# Patient Record
Sex: FEMALE | Race: WHITE | Employment: FULL TIME | URBAN - METROPOLITAN AREA
[De-identification: names, ages, dates, MRNs, and addresses within clinical notes are randomized per-mention and may not be internally consistent; named-entity substitution may affect disease eponyms.]

---

## 2017-01-04 RX ORDER — QUETIAPINE FUMARATE 25 MG/1
TABLET, FILM COATED ORAL
Qty: 30 TABLET | Refills: 5 | Status: SHIPPED | OUTPATIENT
Start: 2017-01-04 | End: 2017-04-13 | Stop reason: CLARIF

## 2017-02-01 RX ORDER — DULOXETIN HYDROCHLORIDE 30 MG/1
CAPSULE, DELAYED RELEASE ORAL
Qty: 30 CAPSULE | Refills: 5 | Status: SHIPPED | OUTPATIENT
Start: 2017-02-01 | End: 2017-04-13 | Stop reason: SDUPTHER

## 2017-02-03 RX ORDER — ALPRAZOLAM 0.5 MG/1
TABLET ORAL
Qty: 30 TABLET | Refills: 0 | Status: SHIPPED | OUTPATIENT
Start: 2017-02-03 | End: 2017-03-09 | Stop reason: SDUPTHER

## 2017-02-15 RX ORDER — SPIRONOLACTONE 50 MG/1
TABLET, FILM COATED ORAL
Qty: 30 TABLET | Refills: 3 | Status: SHIPPED | OUTPATIENT
Start: 2017-02-15 | End: 2017-04-13 | Stop reason: SDUPTHER

## 2017-03-06 RX ORDER — CITALOPRAM 20 MG/1
TABLET ORAL
Qty: 30 TABLET | Refills: 3 | Status: SHIPPED | OUTPATIENT
Start: 2017-03-06 | End: 2017-07-07 | Stop reason: SDUPTHER

## 2017-03-13 ENCOUNTER — TELEPHONE (OUTPATIENT)
Dept: INTERNAL MEDICINE CLINIC | Age: 37
End: 2017-03-13

## 2017-03-13 RX ORDER — ALPRAZOLAM 0.5 MG/1
TABLET ORAL
Qty: 30 TABLET | Refills: 0 | Status: SHIPPED | OUTPATIENT
Start: 2017-03-13 | End: 2017-04-13 | Stop reason: SDUPTHER

## 2017-03-14 RX ORDER — PROPRANOLOL HYDROCHLORIDE 10 MG/1
10 TABLET ORAL 2 TIMES DAILY
Qty: 60 TABLET | Refills: 1 | Status: SHIPPED | OUTPATIENT
Start: 2017-03-14 | End: 2017-04-13 | Stop reason: CLARIF

## 2017-04-10 ENCOUNTER — TELEPHONE (OUTPATIENT)
Dept: INTERNAL MEDICINE CLINIC | Age: 37
End: 2017-04-10

## 2017-04-10 DIAGNOSIS — R19.7 DIARRHEA, UNSPECIFIED TYPE: ICD-10-CM

## 2017-04-10 DIAGNOSIS — R10.9 ABDOMINAL PAIN, UNSPECIFIED LOCATION: Primary | ICD-10-CM

## 2017-04-13 ENCOUNTER — OFFICE VISIT (OUTPATIENT)
Dept: INTERNAL MEDICINE CLINIC | Age: 37
End: 2017-04-13
Payer: COMMERCIAL

## 2017-04-13 VITALS
RESPIRATION RATE: 16 BRPM | TEMPERATURE: 98 F | SYSTOLIC BLOOD PRESSURE: 132 MMHG | DIASTOLIC BLOOD PRESSURE: 88 MMHG | HEART RATE: 88 BPM | WEIGHT: 208 LBS | BODY MASS INDEX: 36.85 KG/M2

## 2017-04-13 DIAGNOSIS — F41.1 GENERALIZED ANXIETY DISORDER: ICD-10-CM

## 2017-04-13 DIAGNOSIS — F32.A DEPRESSION, UNSPECIFIED DEPRESSION TYPE: Primary | ICD-10-CM

## 2017-04-13 DIAGNOSIS — G43.809 OTHER MIGRAINE WITHOUT STATUS MIGRAINOSUS, NOT INTRACTABLE: ICD-10-CM

## 2017-04-13 PROCEDURE — 99214 OFFICE O/P EST MOD 30 MIN: CPT | Performed by: INTERNAL MEDICINE

## 2017-04-13 RX ORDER — DULOXETIN HYDROCHLORIDE 30 MG/1
CAPSULE, DELAYED RELEASE ORAL
Qty: 30 CAPSULE | Refills: 5 | Status: SHIPPED | OUTPATIENT
Start: 2017-04-13 | End: 2017-12-27 | Stop reason: ALTCHOICE

## 2017-04-13 RX ORDER — SPIRONOLACTONE 50 MG/1
TABLET, FILM COATED ORAL
Qty: 60 TABLET | Refills: 5 | Status: SHIPPED | OUTPATIENT
Start: 2017-04-13 | End: 2017-11-18 | Stop reason: SDUPTHER

## 2017-04-13 RX ORDER — ALPRAZOLAM 0.5 MG/1
TABLET ORAL
Qty: 30 TABLET | Refills: 0 | Status: SHIPPED | OUTPATIENT
Start: 2017-04-13 | End: 2017-05-16 | Stop reason: SDUPTHER

## 2017-04-13 RX ORDER — CETIRIZINE HYDROCHLORIDE, PSEUDOEPHEDRINE HYDROCHLORIDE 5; 120 MG/1; MG/1
TABLET, FILM COATED, EXTENDED RELEASE ORAL
Qty: 60 TABLET | Refills: 5 | Status: SHIPPED | OUTPATIENT
Start: 2017-04-13 | End: 2017-10-31 | Stop reason: SDUPTHER

## 2017-04-13 RX ORDER — DULOXETIN HYDROCHLORIDE 60 MG/1
CAPSULE, DELAYED RELEASE ORAL
Qty: 30 CAPSULE | Refills: 5 | Status: SHIPPED | OUTPATIENT
Start: 2017-04-13 | End: 2017-11-18 | Stop reason: SDUPTHER

## 2017-05-16 ENCOUNTER — TELEPHONE (OUTPATIENT)
Dept: INTERNAL MEDICINE CLINIC | Age: 37
End: 2017-05-16

## 2017-05-16 RX ORDER — ALPRAZOLAM 0.5 MG/1
TABLET ORAL
Qty: 30 TABLET | Refills: 0 | Status: SHIPPED | OUTPATIENT
Start: 2017-05-16 | End: 2017-06-21 | Stop reason: SDUPTHER

## 2017-06-21 RX ORDER — ALPRAZOLAM 0.5 MG/1
TABLET ORAL
Qty: 30 TABLET | Refills: 0 | Status: SHIPPED | OUTPATIENT
Start: 2017-06-21 | End: 2017-07-25 | Stop reason: SDUPTHER

## 2017-07-07 RX ORDER — CITALOPRAM 20 MG/1
TABLET ORAL
Qty: 30 TABLET | Refills: 2 | Status: SHIPPED | OUTPATIENT
Start: 2017-07-07 | End: 2017-08-29 | Stop reason: ALTCHOICE

## 2017-07-25 RX ORDER — ALPRAZOLAM 0.5 MG/1
TABLET ORAL
Qty: 30 TABLET | Refills: 0 | Status: SHIPPED | OUTPATIENT
Start: 2017-07-25 | End: 2017-09-12 | Stop reason: SDUPTHER

## 2017-08-29 ENCOUNTER — OFFICE VISIT (OUTPATIENT)
Dept: INTERNAL MEDICINE CLINIC | Age: 37
End: 2017-08-29
Payer: COMMERCIAL

## 2017-08-29 VITALS
HEART RATE: 108 BPM | WEIGHT: 208 LBS | RESPIRATION RATE: 24 BRPM | DIASTOLIC BLOOD PRESSURE: 78 MMHG | SYSTOLIC BLOOD PRESSURE: 108 MMHG | TEMPERATURE: 98.7 F | HEIGHT: 63 IN | BODY MASS INDEX: 36.86 KG/M2

## 2017-08-29 DIAGNOSIS — F32.A DEPRESSION, UNSPECIFIED DEPRESSION TYPE: Primary | ICD-10-CM

## 2017-08-29 DIAGNOSIS — R10.9 ABDOMINAL PAIN, UNSPECIFIED LOCATION: ICD-10-CM

## 2017-08-29 DIAGNOSIS — F41.1 GENERALIZED ANXIETY DISORDER: ICD-10-CM

## 2017-08-29 PROCEDURE — 99214 OFFICE O/P EST MOD 30 MIN: CPT | Performed by: INTERNAL MEDICINE

## 2017-08-29 RX ORDER — HYOSCYAMINE SULFATE 0.125 MG
125 TABLET ORAL EVERY 4 HOURS PRN
COMMUNITY
End: 2018-02-07 | Stop reason: ALTCHOICE

## 2017-08-29 ASSESSMENT — PATIENT HEALTH QUESTIONNAIRE - PHQ9
1. LITTLE INTEREST OR PLEASURE IN DOING THINGS: 0
SUM OF ALL RESPONSES TO PHQ9 QUESTIONS 1 & 2: 0
2. FEELING DOWN, DEPRESSED OR HOPELESS: 0
SUM OF ALL RESPONSES TO PHQ QUESTIONS 1-9: 0

## 2017-09-12 ENCOUNTER — TELEPHONE (OUTPATIENT)
Dept: INTERNAL MEDICINE CLINIC | Age: 37
End: 2017-09-12

## 2017-09-12 RX ORDER — ALPRAZOLAM 0.5 MG/1
TABLET ORAL
Qty: 30 TABLET | Refills: 0 | Status: SHIPPED | OUTPATIENT
Start: 2017-09-12 | End: 2017-10-20 | Stop reason: SDUPTHER

## 2017-09-22 ENCOUNTER — TELEPHONE (OUTPATIENT)
Dept: INTERNAL MEDICINE CLINIC | Age: 37
End: 2017-09-22

## 2017-09-22 RX ORDER — BUPROPION HYDROCHLORIDE 75 MG/1
75 TABLET ORAL 2 TIMES DAILY
Qty: 60 TABLET | Refills: 0 | Status: SHIPPED | OUTPATIENT
Start: 2017-09-22 | End: 2017-10-21 | Stop reason: SDUPTHER

## 2017-10-20 RX ORDER — ALPRAZOLAM 0.5 MG/1
TABLET ORAL
Qty: 30 TABLET | Refills: 0 | Status: SHIPPED | OUTPATIENT
Start: 2017-10-20 | End: 2017-11-20 | Stop reason: SDUPTHER

## 2017-10-23 RX ORDER — BUPROPION HYDROCHLORIDE 75 MG/1
TABLET ORAL
Qty: 60 TABLET | Refills: 2 | Status: SHIPPED | OUTPATIENT
Start: 2017-10-23 | End: 2017-12-27 | Stop reason: ALTCHOICE

## 2017-11-15 ENCOUNTER — TELEPHONE (OUTPATIENT)
Dept: INTERNAL MEDICINE CLINIC | Age: 37
End: 2017-11-15

## 2017-11-15 DIAGNOSIS — M79.672 LEFT FOOT PAIN: Primary | ICD-10-CM

## 2017-11-15 NOTE — TELEPHONE ENCOUNTER
Patient is asking for an order for an x-ray for left foot? States she thinks she broke her toe. She knows there is nothing that can be done, but would like to know if it is broke. She says she has had this pain for a month now.

## 2017-11-15 NOTE — TELEPHONE ENCOUNTER
Okay to order the x-ray but I think she needs to see podiatry who can do the x-ray at their office but patient wants to get an x-ray was okay with me

## 2017-11-16 NOTE — TELEPHONE ENCOUNTER
[facility] xray order ready to  and referral faxed to [address] [facility]

## 2017-11-16 NOTE — TELEPHONE ENCOUNTER
Pt would like to see Dr Rebecca Rodriguez    Would like xray ordered in mean time-     Pt would like to stop in and  order please call when ready

## 2017-11-20 RX ORDER — ALPRAZOLAM 0.5 MG/1
TABLET ORAL
Qty: 30 TABLET | Refills: 0 | Status: SHIPPED | OUTPATIENT
Start: 2017-11-20 | End: 2017-12-27 | Stop reason: SDUPTHER

## 2017-11-20 RX ORDER — DULOXETIN HYDROCHLORIDE 60 MG/1
CAPSULE, DELAYED RELEASE ORAL
Qty: 30 CAPSULE | Refills: 2 | Status: SHIPPED | OUTPATIENT
Start: 2017-11-20 | End: 2018-05-17 | Stop reason: SDUPTHER

## 2017-11-20 RX ORDER — SPIRONOLACTONE 50 MG/1
TABLET, FILM COATED ORAL
Qty: 60 TABLET | Refills: 2 | Status: SHIPPED | OUTPATIENT
Start: 2017-11-20 | End: 2018-03-11 | Stop reason: SDUPTHER

## 2017-11-29 ENCOUNTER — TELEPHONE (OUTPATIENT)
Dept: INTERNAL MEDICINE CLINIC | Age: 37
End: 2017-11-29

## 2017-11-29 DIAGNOSIS — M79.672 LEFT FOOT PAIN: ICD-10-CM

## 2017-12-18 ENCOUNTER — TELEPHONE (OUTPATIENT)
Dept: INTERNAL MEDICINE CLINIC | Age: 37
End: 2017-12-18

## 2017-12-18 NOTE — TELEPHONE ENCOUNTER
Health Maintenance   Topic Date Due    DTaP/Tdap/Td vaccine (1 - Tdap) 01/30/1997    Flu vaccine (1) 09/01/2017    Cervical cancer screen  08/29/2018 (Originally 10/10/2016)    HIV screen  08/29/2018 (Originally 1/30/1993)             (applicable per patient's age: Cancer Screenings, Depression Screening, Fall Risk Screening, Immunizations)    LDL Calculated (mg/dL)   Date Value   12/11/2014 111     AST (U/L)   Date Value   12/01/2016 17     ALT (U/L)   Date Value   12/01/2016 20     BUN (mg/dL)   Date Value   12/01/2016 13      (goal A1C is < 7)   (goal LDL is <100) need 30-50% reduction from baseline     BP Readings from Last 3 Encounters:   08/29/17 108/78   04/13/17 132/88   12/29/16 (!) 148/94    (goal /80)      All Future Testing planned in [facility]:      Next Visit Date:  Future Appointments  Date Time Provider Viola Archer   12/27/2017 1:30 PM Trenton Henriquez MD Adult pc [address] [facility]            Patient Active Problem List:     Migraine     Acne     Hypercholesteremia     Atrial septal defect     SVT (supraventricular tachycardia) (HCC)     Depression     Anxiety     Seasonal allergies     Chronic shoulder pain     Sprain, knee     Cervical strain     Chronic rhinitis

## 2017-12-27 ENCOUNTER — OFFICE VISIT (OUTPATIENT)
Dept: INTERNAL MEDICINE CLINIC | Age: 37
End: 2017-12-27
Payer: COMMERCIAL

## 2017-12-27 VITALS
HEART RATE: 112 BPM | SYSTOLIC BLOOD PRESSURE: 124 MMHG | HEIGHT: 63 IN | DIASTOLIC BLOOD PRESSURE: 84 MMHG | RESPIRATION RATE: 18 BRPM | TEMPERATURE: 98.3 F | BODY MASS INDEX: 37.78 KG/M2 | WEIGHT: 213.2 LBS

## 2017-12-27 DIAGNOSIS — F32.A DEPRESSION, UNSPECIFIED DEPRESSION TYPE: ICD-10-CM

## 2017-12-27 DIAGNOSIS — F41.1 GENERALIZED ANXIETY DISORDER: Primary | ICD-10-CM

## 2017-12-27 DIAGNOSIS — R10.13 EPIGASTRIC PAIN: ICD-10-CM

## 2017-12-27 PROCEDURE — 99214 OFFICE O/P EST MOD 30 MIN: CPT | Performed by: INTERNAL MEDICINE

## 2017-12-27 RX ORDER — ALPRAZOLAM 0.5 MG/1
TABLET ORAL
Qty: 30 TABLET | Refills: 0 | Status: SHIPPED | OUTPATIENT
Start: 2017-12-27 | End: 2018-03-29 | Stop reason: SDUPTHER

## 2017-12-27 RX ORDER — DULOXETIN HYDROCHLORIDE 30 MG/1
60 CAPSULE, DELAYED RELEASE ORAL DAILY
COMMUNITY
Start: 2017-04-13 | End: 2017-12-27 | Stop reason: ALTCHOICE

## 2017-12-27 RX ORDER — SUCRALFATE 1 G/1
1 TABLET ORAL 3 TIMES DAILY
COMMUNITY
Start: 2017-12-18 | End: 2019-04-09

## 2017-12-27 RX ORDER — NICOTINE POLACRILEX 4 MG/1
20 GUM, CHEWING ORAL DAILY
COMMUNITY
Start: 2017-12-18 | End: 2019-04-09

## 2017-12-27 RX ORDER — FLUOXETINE HYDROCHLORIDE 20 MG/1
20 CAPSULE ORAL DAILY
COMMUNITY
End: 2018-02-07 | Stop reason: ALTCHOICE

## 2017-12-27 NOTE — PROGRESS NOTES
facility-administered medications for this visit.         No Known Allergies    Past Medical History:   Diagnosis Date    Acne     Anxiety     Atrial septal defect     Depression     GERD (gastroesophageal reflux disease)     Hypercholesteremia     Hypertension     Migraine     Seasonal allergies     SV[facility]) ([facility])        Past Surgical History:   Procedure Laterality Date    ASD REPAIR  2/16/00    CARDIAC SURGERY      CARPAL TUNNEL RELEASE Right     HERNIA REPAIR  05/17/2016    HYSTERECTOMY  05/17/2016       Family History   Problem Relation Age of Onset    Diabetes Other     Cancer Other     Asthma Other     Heart Disease Other     Migraines Other      ROS   Constitutional:  Negative for fatigue, loss of appetite and unexpected weight change   HEENT            : Negative for neck stiffness and pain, no congestion or sinus pressure   Eyes                : No visual disturbance or pain   Cardiovascular: No chest pain or palpitations or leg swelling   Respiratory      : Negative for cough, shortness of breath or wheezing   Gastrointestinal: Negative for abdominal pain, constipation or diarrhea and bloating No nausea or vomiting   Genitourinary:     No urgency or frequency, no burning or hematuria   Musculoskeletal: No arthralgias, back pain or myalgias   Skin                  : Negative for rash or erythema   Neurological    : Negative for dizziness, weakness, tremors ,light headedness or syncope   Psychiatric       : Negative for dysphoric mood, sleep disturbances, nervous or anxious, or decreased concentration   All other review of systems was negative    Objective  Physical Examination:    Nursing note reviewed    /84   Pulse 112   Temp 98.3 °F (36.8 °C) (Oral)   Resp 18   Ht 5' 3\" (1.6 m)   Wt 213 lb 3.2 oz (96.7 kg)   LMP 03/30/2015   BMI 37.77 kg/m²   BP Readings from Last 3 Encounters:   12/27/17 124/84   08/29/17 108/78   04/13/17 132/88 accurately reflects the content of the visit, no guarantee can be provided that every mistake has been identified and corrected by editing.

## 2017-12-27 NOTE — PROGRESS NOTES
Visit Information    Have you changed or started any medications since your last visit including any over-the-counter medicines, vitamins, or herbal medicines? yes - see list   Are you having any side effects from any of your medications? -  no  Have you stopped taking any of your medications? Is so, why? -  yes - see list    Have you seen any other physician or provider since your last visit? Yes - Records Obtained  Have you had any other diagnostic tests since your last visit? No  Have you been seen in the emergency room and/or had an admission to a hospital since we last saw you? Yes - Records Obtained  Have you had your routine dental cleaning in the past 6 months? no    Have you activated your [employer] account? If not, what are your barriers?  Yes     Patient Care Team:  Cristian Landin MD as PCP - General (Internal Medicine)  Jolinda Soulier, MD as Consulting Physician (Gastroenterology)    Medical History Review  Past Medical, Family, and Social History reviewed and does contribute to the patient presenting condition    Health Maintenance   Topic Date Due    DTaP/Tdap/Td vaccine (1 - Tdap) 06/04/2018 (Originally 1/30/1997)    Cervical cancer screen  08/29/2018 (Originally 10/10/2016)    HIV screen  08/29/2018 (Originally 1/30/1993)    Flu vaccine (1) 12/03/2018 (Originally 9/1/2017)

## 2018-01-05 ENCOUNTER — TELEPHONE (OUTPATIENT)
Dept: INTERNAL MEDICINE CLINIC | Age: 38
End: 2018-01-05

## 2018-01-05 NOTE — TELEPHONE ENCOUNTER
Pt is calling stating she is having another \"episode\" with severe stomach pain     Pt states you would understand what this meant    Pt also states Dr Jorje Azevedo is out of office or she would call there

## 2018-01-05 NOTE — TELEPHONE ENCOUNTER
Ask patient to go to the emergency room if symptoms get worse if not ask patient to talk to Dr. Micheal Andrade

## 2018-01-22 RX ORDER — BUPROPION HYDROCHLORIDE 75 MG/1
TABLET ORAL
Qty: 60 TABLET | Refills: 3 | Status: SHIPPED | OUTPATIENT
Start: 2018-01-22 | End: 2018-02-07 | Stop reason: ALTCHOICE

## 2018-01-30 RX ORDER — SUMATRIPTAN 100 MG/1
TABLET, FILM COATED ORAL
Qty: 9 TABLET | Refills: 4 | Status: SHIPPED | OUTPATIENT
Start: 2018-01-30 | End: 2019-04-04 | Stop reason: SDUPTHER

## 2018-02-07 ENCOUNTER — OFFICE VISIT (OUTPATIENT)
Dept: INTERNAL MEDICINE CLINIC | Age: 38
End: 2018-02-07
Payer: COMMERCIAL

## 2018-02-07 VITALS
BODY MASS INDEX: 38.98 KG/M2 | OXYGEN SATURATION: 94 % | DIASTOLIC BLOOD PRESSURE: 74 MMHG | WEIGHT: 220 LBS | SYSTOLIC BLOOD PRESSURE: 126 MMHG | HEIGHT: 63 IN | RESPIRATION RATE: 15 BRPM | TEMPERATURE: 100.2 F | HEART RATE: 126 BPM

## 2018-02-07 DIAGNOSIS — J02.9 PHARYNGITIS, UNSPECIFIED ETIOLOGY: Primary | ICD-10-CM

## 2018-02-07 DIAGNOSIS — J02.0 STREP THROAT: ICD-10-CM

## 2018-02-07 LAB — S PYO AG THROAT QL: POSITIVE

## 2018-02-07 PROCEDURE — 99213 OFFICE O/P EST LOW 20 MIN: CPT | Performed by: INTERNAL MEDICINE

## 2018-02-07 PROCEDURE — 87880 STREP A ASSAY W/OPTIC: CPT | Performed by: INTERNAL MEDICINE

## 2018-02-07 PROCEDURE — 96372 THER/PROPH/DIAG INJ SC/IM: CPT | Performed by: INTERNAL MEDICINE

## 2018-02-07 RX ORDER — LAMOTRIGINE 25 MG/1
100 TABLET ORAL DAILY
COMMUNITY
Start: 2018-02-03 | End: 2019-04-09

## 2018-02-07 RX ORDER — METHYLPREDNISOLONE SODIUM SUCCINATE 125 MG/2ML
125 INJECTION, POWDER, LYOPHILIZED, FOR SOLUTION INTRAMUSCULAR; INTRAVENOUS ONCE
Qty: 125 MG | Refills: 0 | Status: SHIPPED | OUTPATIENT
Start: 2018-02-07 | End: 2018-02-07 | Stop reason: SDUPTHER

## 2018-02-07 RX ORDER — METHYLPREDNISOLONE SODIUM SUCCINATE 125 MG/2ML
125 INJECTION, POWDER, LYOPHILIZED, FOR SOLUTION INTRAMUSCULAR; INTRAVENOUS ONCE
Status: COMPLETED | OUTPATIENT
Start: 2018-02-07 | End: 2018-02-07

## 2018-02-07 RX ORDER — PREDNISONE 10 MG/1
10 TABLET ORAL
Qty: 15 TABLET | Refills: 0 | Status: SHIPPED | OUTPATIENT
Start: 2018-02-07 | End: 2018-02-12

## 2018-02-07 RX ORDER — CEPHALEXIN 500 MG/1
500 CAPSULE ORAL 3 TIMES DAILY
Qty: 30 CAPSULE | Refills: 0 | Status: SHIPPED | OUTPATIENT
Start: 2018-02-07 | End: 2018-11-21 | Stop reason: ALTCHOICE

## 2018-02-07 RX ORDER — CEFTRIAXONE 1 G/1
1 INJECTION, POWDER, FOR SOLUTION INTRAMUSCULAR; INTRAVENOUS ONCE
Status: COMPLETED | OUTPATIENT
Start: 2018-02-07 | End: 2018-02-07

## 2018-02-07 RX ORDER — LORATADINE 10 MG/1
10 TABLET ORAL DAILY
Qty: 30 TABLET | Refills: 0 | Status: SHIPPED | OUTPATIENT
Start: 2018-02-07 | End: 2019-04-09

## 2018-02-07 RX ORDER — CEFTRIAXONE 1 G/1
1 INJECTION, POWDER, FOR SOLUTION INTRAMUSCULAR; INTRAVENOUS ONCE
Qty: 1 G | Refills: 0
Start: 2018-02-07 | End: 2018-02-07 | Stop reason: SDUPTHER

## 2018-02-07 RX ORDER — LIDOCAINE HYDROCHLORIDE 10 MG/ML
2 INJECTION, SOLUTION EPIDURAL; INFILTRATION; INTRACAUDAL; PERINEURAL ONCE
Status: COMPLETED | OUTPATIENT
Start: 2018-02-07 | End: 2018-02-07

## 2018-02-07 RX ADMIN — CEFTRIAXONE 1 G: 1 INJECTION, POWDER, FOR SOLUTION INTRAMUSCULAR; INTRAVENOUS at 16:04

## 2018-02-07 RX ADMIN — METHYLPREDNISOLONE SODIUM SUCCINATE 125 MG: 125 INJECTION, POWDER, LYOPHILIZED, FOR SOLUTION INTRAMUSCULAR; INTRAVENOUS at 16:07

## 2018-02-07 RX ADMIN — LIDOCAINE HYDROCHLORIDE 2 ML: 10 INJECTION, SOLUTION EPIDURAL; INFILTRATION; INTRACAUDAL; PERINEURAL at 16:06

## 2018-02-07 NOTE — PROGRESS NOTES
Chronic Disease Visit Information    BP Readings from Last 3 Encounters:   12/27/17 124/84   08/29/17 108/78   04/13/17 132/88          LDL Calculated (mg/dL)   Date Value   12/11/2014 111     HDL (mg/dL)   Date Value   12/11/2014 43     BUN (mg/dL)   Date Value   12/01/2016 13     CREATININE (mg/dL)   Date Value   12/01/2016 0.54     Glucose (mg/dL)   Date Value   12/01/2016 104 (H)            Have you changed or started any medications since your last visit including any over-the-counter medicines, vitamins, or herbal medicines? no   Are you having any side effects from any of your medications? -  no  Have you stopped taking any of your medications? Is so, why? -  no    Have you seen any other physician or provider since your last visit? No  Have you had any other diagnostic tests since your last visit? Yes - Records Obtained  Have you been seen in the emergency room and/or had an admission to a hospital since we last saw you? No  Have you had your annual diabetic retinal (eye) exam? No  Have you had your routine dental cleaning in the past 6 months? no    Have you activated your [employer] account? If not, what are your barriers?  Yes     Patient Care Team:  Jaswant Potter MD as PCP - General (Internal Medicine)  Enrique Cohen MD as Consulting Physician (Gastroenterology)         Medical History Review  Past Medical, Family, and Social History reviewed and does contribute to the patient presenting condition    Health Maintenance   Topic Date Due    Potassium monitoring  12/01/2017    Creatinine monitoring  12/01/2017    Diabetes screen  01/30/2018    DTaP/Tdap/Td vaccine (1 - Tdap) 06/04/2018 (Originally 1/30/1997)    Cervical cancer screen  08/29/2018 (Originally 10/10/2016)    HIV screen  08/29/2018 (Originally 1/30/1993)    Flu vaccine (1) 12/03/2018 (Originally 9/1/2017)    Lipid screen  12/11/2019
normocephalic, external ears normal bilaterally, nose normal no oropharyngeal exudate and is clear and moist but erythematous oropharynx  Eyes:  EOCM normal; conjunctivae normal; PERRLA bilaterally  Neck:  Normal range of motion, neck supple, no JVD and no thyromegaly  Cardiovascular:  RRR, normal heart sounds and intact distal pulses  Pulmonary:  effort normal and breath sounds normal bilaterally,no wheezes or rales, no respiratory distress  Abdominal:  Soft, non-tender; normal bowel sounds, no masses  Musculoskeletal:  Normal range of motion and no edema or tenderness bilaterally  No lymphadenopathy  Neurological:  alert, oriented, and normal reflexes bilaterally  Skin: warm and dry  Psychiatric:  normal mood and effect; behavior normal.    Labs:   No results found for: LABA1C  Lab Results   Component Value Date    CHOL 194 12/11/2014     Lab Results   Component Value Date    HDL 43 12/11/2014     Lab Results   Component Value Date    LDLCALC 111 12/11/2014     Lab Results   Component Value Date    TRIG 198 12/11/2014     No components found for: [place], [place]  Lab Results   Component Value Date    WBC 8.8 12/01/2016    HGB 13.9 12/01/2016    HCT 41.8 12/01/2016    MCV 87.4 12/01/2016     12/01/2016     No results found for: INR, PROTIME  Lab Results   Component Value Date    GLUCOSE 104 (H) 12/01/2016    CREATININE 0.54 12/01/2016    BUN 13 12/01/2016     12/01/2016    K 3.7 12/01/2016    CL 99 12/01/2016    CO2 28 12/01/2016     Lab Results   Component Value Date    ALT 20 12/01/2016    AST 17 12/01/2016    ALKPHOS 84 12/01/2016    BILITOT 0.29 (L) 12/01/2016     Lab Results   Component Value Date    LABALBU 4.2 12/01/2016     No results found for: TSH, CBC  Assessment:  1.  Pharyngitis, unspecified etiology        Plan:  Strep screen done  Keflex 500 mg 3 times a day for 10 days  Prednisone 10 mg 3 times a day for 5 days  [place] gargles  Claritin 10 mg daily  Solu-Medrol 125 mg IM given  Rocephin 1 g

## 2018-02-12 ENCOUNTER — TELEPHONE (OUTPATIENT)
Dept: INTERNAL MEDICINE CLINIC | Age: 38
End: 2018-02-12

## 2018-02-12 DIAGNOSIS — R09.89 CHEST CONGESTION: Primary | ICD-10-CM

## 2018-02-12 RX ORDER — AZITHROMYCIN 250 MG/1
TABLET, FILM COATED ORAL
Qty: 1 PACKET | Refills: 0 | Status: SHIPPED | OUTPATIENT
Start: 2018-02-12 | End: 2018-02-22

## 2018-02-12 NOTE — TELEPHONE ENCOUNTER
Patient was seen in office 02/07/2018. Still not any better, Throat is still really sore and has moved into chest.  Patient is now feeling worse and asking for something stronger to be called in to [facility] on [address] in [place].

## 2018-03-13 RX ORDER — SPIRONOLACTONE 50 MG/1
TABLET, FILM COATED ORAL
Qty: 60 TABLET | Refills: 1 | Status: SHIPPED | OUTPATIENT
Start: 2018-03-13 | End: 2018-05-20 | Stop reason: SDUPTHER

## 2018-03-23 ENCOUNTER — HOSPITAL ENCOUNTER (EMERGENCY)
Age: 38
Discharge: HOME | End: 2018-03-23
Attending: EMERGENCY MEDICINE
Payer: COMMERCIAL

## 2018-03-23 VITALS
OXYGEN SATURATION: 99 % | TEMPERATURE: 98.2 F | BODY MASS INDEX: 37.21 KG/M2 | WEIGHT: 210 LBS | HEART RATE: 106 BPM | HEIGHT: 63 IN | SYSTOLIC BLOOD PRESSURE: 155 MMHG | DIASTOLIC BLOOD PRESSURE: 89 MMHG
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
## 2018-03-23 PROCEDURE — 6370000000 HC RX 637 (ALT 250 FOR IP): Performed by: EMERGENCY MEDICINE

## 2018-03-23 PROCEDURE — 99283 EMERGENCY DEPT VISIT LOW MDM: CPT

## 2018-03-23 RX ORDER — IBUPROFEN 600 MG/1
600 TABLET ORAL EVERY 6 HOURS PRN
Qty: 120 TABLET | Refills: 0 | Status: SHIPPED | OUTPATIENT
Start: 2018-03-23 | End: 2019-04-09

## 2018-03-23 RX ORDER — CYCLOBENZAPRINE HCL 10 MG
10 TABLET ORAL 3 TIMES DAILY PRN
Qty: 30 TABLET | Refills: 0 | Status: SHIPPED | OUTPATIENT
Start: 2018-03-23 | End: 2018-04-02

## 2018-03-23 RX ORDER — IBUPROFEN 800 MG/1
800 TABLET ORAL ONCE
Status: COMPLETED | OUTPATIENT
Start: 2018-03-23 | End: 2018-03-23

## 2018-03-23 RX ADMIN — IBUPROFEN 800 MG: 800 TABLET, FILM COATED ORAL at 09:21

## 2018-03-23 ASSESSMENT — PAIN DESCRIPTION - PAIN TYPE: TYPE: ACUTE PAIN

## 2018-03-23 ASSESSMENT — ENCOUNTER SYMPTOMS
RESPIRATORY NEGATIVE: 1
BACK PAIN: 0
GASTROINTESTINAL NEGATIVE: 1

## 2018-03-23 ASSESSMENT — PAIN DESCRIPTION - LOCATION: LOCATION: LEG

## 2018-03-23 ASSESSMENT — PAIN SCALES - GENERAL
PAINLEVEL_OUTOF10: 2
PAINLEVEL_OUTOF10: 2
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
## 2018-04-02 RX ORDER — ALPRAZOLAM 0.5 MG/1
TABLET ORAL
Qty: 30 TABLET | Refills: 0 | Status: SHIPPED | OUTPATIENT
Start: 2018-04-02 | End: 2018-04-29

## 2018-05-01 RX ORDER — CETIRIZINE HCL/PSEUDOEPHEDRINE 5 MG-120MG
TABLET, EXTENDED RELEASE 12 HR ORAL
Qty: 60 TABLET | Refills: 3 | Status: SHIPPED | OUTPATIENT
Start: 2018-05-01 | End: 2018-07-23 | Stop reason: SDUPTHER

## 2018-05-18 RX ORDER — DULOXETIN HYDROCHLORIDE 60 MG/1
60 CAPSULE, DELAYED RELEASE ORAL DAILY
Qty: 30 CAPSULE | Refills: 2 | Status: SHIPPED | OUTPATIENT
Start: 2018-05-18 | End: 2018-07-23 | Stop reason: SDUPTHER

## 2018-05-18 RX ORDER — ALPRAZOLAM 0.5 MG/1
0.5 TABLET ORAL NIGHTLY PRN
Qty: 30 TABLET | Refills: 0 | Status: SHIPPED | OUTPATIENT
Start: 2018-05-18 | End: 2018-06-28 | Stop reason: SDUPTHER

## 2018-05-21 RX ORDER — ALPRAZOLAM 0.5 MG/1
TABLET ORAL
Qty: 30 TABLET | Refills: 0 | OUTPATIENT
Start: 2018-05-21

## 2018-05-21 RX ORDER — DULOXETIN HYDROCHLORIDE 60 MG/1
CAPSULE, DELAYED RELEASE ORAL
Qty: 30 CAPSULE | Refills: 1 | OUTPATIENT
Start: 2018-05-21

## 2018-05-21 RX ORDER — SPIRONOLACTONE 50 MG/1
TABLET, FILM COATED ORAL
Qty: 60 TABLET | Refills: 2 | Status: SHIPPED | OUTPATIENT
Start: 2018-05-21 | End: 2018-08-25 | Stop reason: SDUPTHER

## 2018-06-28 DIAGNOSIS — G47.00 INSOMNIA, UNSPECIFIED TYPE: Primary | ICD-10-CM

## 2018-06-29 ENCOUNTER — TELEPHONE (OUTPATIENT)
Dept: INTERNAL MEDICINE CLINIC | Age: 38
End: 2018-06-29

## 2018-06-29 RX ORDER — ALPRAZOLAM 0.5 MG/1
0.5 TABLET ORAL NIGHTLY PRN
Qty: 30 TABLET | Refills: 0 | Status: SHIPPED | OUTPATIENT
Start: 2018-06-29 | End: 2018-07-23 | Stop reason: SDUPTHER

## 2018-07-23 ENCOUNTER — OFFICE VISIT (OUTPATIENT)
Dept: INTERNAL MEDICINE CLINIC | Age: 38
End: 2018-07-23
Payer: COMMERCIAL

## 2018-07-23 VITALS
TEMPERATURE: 99 F | BODY MASS INDEX: 37.21 KG/M2 | HEIGHT: 63 IN | WEIGHT: 210 LBS | HEART RATE: 93 BPM | DIASTOLIC BLOOD PRESSURE: 82 MMHG | SYSTOLIC BLOOD PRESSURE: 128 MMHG | OXYGEN SATURATION: 96 %

## 2018-07-23 DIAGNOSIS — L25.9 CONTACT DERMATITIS, UNSPECIFIED CONTACT DERMATITIS TYPE, UNSPECIFIED TRIGGER: ICD-10-CM

## 2018-07-23 DIAGNOSIS — F41.1 GENERALIZED ANXIETY DISORDER: ICD-10-CM

## 2018-07-23 DIAGNOSIS — R21 RASH: Primary | ICD-10-CM

## 2018-07-23 DIAGNOSIS — G47.00 INSOMNIA, UNSPECIFIED TYPE: ICD-10-CM

## 2018-07-23 PROCEDURE — 99214 OFFICE O/P EST MOD 30 MIN: CPT | Performed by: INTERNAL MEDICINE

## 2018-07-23 RX ORDER — PREDNISONE 10 MG/1
10 TABLET ORAL
Qty: 15 TABLET | Refills: 0 | Status: SHIPPED | OUTPATIENT
Start: 2018-07-23 | End: 2018-07-28

## 2018-07-23 RX ORDER — ALPRAZOLAM 0.5 MG/1
0.5 TABLET ORAL NIGHTLY PRN
Qty: 30 TABLET | Refills: 0 | Status: SHIPPED | OUTPATIENT
Start: 2018-07-23 | End: 2018-09-06 | Stop reason: SDUPTHER

## 2018-07-23 RX ORDER — CETIRIZINE HYDROCHLORIDE, PSEUDOEPHEDRINE HYDROCHLORIDE 5; 120 MG/1; MG/1
1 TABLET, FILM COATED, EXTENDED RELEASE ORAL 2 TIMES DAILY
Qty: 60 TABLET | Refills: 2 | Status: SHIPPED | OUTPATIENT
Start: 2018-07-23 | End: 2018-11-21 | Stop reason: SDUPTHER

## 2018-07-23 RX ORDER — DULOXETIN HYDROCHLORIDE 60 MG/1
60 CAPSULE, DELAYED RELEASE ORAL DAILY
Qty: 30 CAPSULE | Refills: 2 | Status: SHIPPED | OUTPATIENT
Start: 2018-07-23 | End: 2018-11-10 | Stop reason: SDUPTHER

## 2018-07-23 RX ORDER — TRIAMCINOLONE ACETONIDE 0.25 MG/G
CREAM TOPICAL
Qty: 1 TUBE | Refills: 0 | Status: SHIPPED | OUTPATIENT
Start: 2018-07-23 | End: 2019-01-31 | Stop reason: SDUPTHER

## 2018-07-23 NOTE — PROGRESS NOTES
Visit Information    Have you changed or started any medications since your last visit including any over-the-counter medicines, vitamins, or herbal medicines? no   Have you stopped taking any of your medications? Is so, why? -  no  Are you having any side effects from any of your medications? - no    Have you seen any other physician or provider since your last visit? yes - Dr. Shayy Smith    Have you had any other diagnostic tests since your last visit?  no   Have you been seen in the emergency room and/or had an admission in a hospital since we last saw you?  no   Have you had your routine dental cleaning in the past 6 months?  no     Do you have an active MyChart account? If no, what is the barrier?   Yes    Patient Care Team:  Zain Cronin MD as PCP - General (Internal Medicine)  Natalee Ray MD as Consulting Physician (Gastroenterology)    Medical History Review  Past Medical, Family, and Social History reviewed and does contribute to the patient presenting condition    Health Maintenance   Topic Date Due    DTaP/Tdap/Td vaccine (1 - Tdap) 01/30/1997    Potassium monitoring  12/01/2017    Creatinine monitoring  12/01/2017    Diabetes screen  01/30/2018    Cervical cancer screen  08/29/2018 (Originally 10/10/2016)    HIV screen  08/29/2018 (Originally 1/30/1993)    Flu vaccine (1) 12/03/2018 (Originally 9/1/2018)    Lipid screen  12/11/2019

## 2018-07-23 NOTE — PROGRESS NOTES
Leisa Plaza is a 34 y.o. female who presents for   Chief Complaint   Patient presents with    Anxiety    Depression    Rash     all over body/rash mostly on hands very itchy all over x 1 week after vacation     and follow up of chronic medical problems. Patient Active Problem List   Diagnosis    Migraine    Acne    Hypercholesteremia    Atrial septal defect    [facility])    Depression    Anxiety    Seasonal allergies    Chronic shoulder pain    Sprain, knee    Cervical strain    Chronic rhinitis     HPI  Here for follow-up on anxiety and also patient complains of rash on both hands and ankles started after she walked in the sand last week when she was on vacation    Current Outpatient Prescriptions   Medication Sig Dispense Refill    cetirizine-psuedoephedrine (ZYRTEC-D ALLERGY & CONGESTION) 5-120 MG per extended release tablet Take 1 tablet by mouth 2 times daily 60 tablet 2    ALPRAZolam (XANAX) 0.5 MG tablet Take 1 tablet by mouth nightly as needed for Sleep for up to 30 days. TAKE ONE-HALF TABLET BY MOUTH TWO TIMES A DAY. 30 tablet 0    DULoxetine (CYMBALTA) 60 MG extended release capsule Take 1 capsule by mouth daily 30 capsule 2    predniSONE (DELTASONE) 10 MG tablet Take 1 tablet by mouth 3 times daily (with meals) for 5 days 15 tablet 0    triamcinolone (KENALOG) 0.025 % cream Apply topically 2 times daily.  1 Tube 0    spironolactone (ALDACTONE) 50 MG tablet TAKE ONE TABLET BY MOUTH TWICE A DAY 60 tablet 2    lamoTRIgine (LAMICTAL) 25 MG tablet Take 100 mg by mouth daily       SUMAtriptan (IMITREX) 100 MG tablet TAKE ONE TABLET BY MOUTH ONCE AS NEEDED FOR MIGRAINE 9 tablet 4    Clindamycin-Benzoyl Per, Refr, 1.2-5 % GEL Apply 1 Tube topically daily 1 Tube 3    TAZORAC 0.1 % cream       ibuprofen (IBU) 600 MG tablet Take 1 tablet by mouth every 6 hours as needed for Pain 120 tablet 0    cephALEXin (KEFLEX) 500 MG capsule Take 1 capsule by mouth 3 times daily 30 capsule 0    loratadine (CLARITIN) 10 MG tablet Take 1 tablet by mouth daily 30 tablet 0    sucralfate (CARAFATE) 1 GM tablet Take 1 g by mouth 3 times daily For 10 days      omeprazole 20 MG EC tablet Take 20 mg by mouth daily For 20 days      SUMAtriptan (IMITREX) 100 MG tablet Take 1 tablet by mouth once as needed for up to 1 dose. 9 tablet 5     No current facility-administered medications for this visit.         No Known Allergies    Past Medical History:   Diagnosis Date    Acne     Anxiety     Atrial septal defect     Depression     GERD (gastroesophageal reflux disease)     Hypercholesteremia     Hypertension     Migraine     Seasonal allergies     [facility]        Past Surgical History:   Procedure Laterality Date    ASD REPAIR  2/16/00    CARDIAC SURGERY      CARPAL TUNNEL RELEASE Right     HERNIA REPAIR  05/17/2016    HYSTERECTOMY  05/17/2016       Family History   Problem Relation Age of Onset    Diabetes Other     Cancer Other     Asthma Other     Heart Disease Other     Migraines Other      ROS   Constitutional:  Negative for fatigue, loss of appetite and unexpected weight change   HEENT            : Negative for neck stiffness and pain, no congestion or sinus pressure   Eyes                : No visual disturbance or pain   Cardiovascular: No chest pain or palpitations or leg swelling   Respiratory      : Negative for cough, shortness of breath or wheezing   Gastrointestinal: Negative for abdominal pain, constipation or diarrhea and bloating No nausea or vomiting   Genitourinary:     No urgency or frequency, no burning or hematuria   Musculoskeletal: No arthralgias, back pain or myalgias   Skin                  : Positive for rash or erythema   Neurological    : Negative for dizziness, weakness, tremors ,light headedness or syncope   Psychiatric       : Negative for dysphoric mood, sleep disturbances, nervous or anxious, or decreased concentration   All other review of systems was negative    Objective  Physical Examination:    Nursing note reviewed    /82 (Site: Left Arm, Position: Sitting, Cuff Size: Medium Adult)   Pulse 93   Temp 99 °F (37.2 °C) (Oral)   Ht 5' 3\" (1.6 m)   Wt 210 lb (95.3 kg)   LMP 03/30/2015   SpO2 96%   BMI 37.20 kg/m²   BP Readings from Last 3 Encounters:   07/23/18 128/82   03/23/18 (!) 155/89   02/07/18 126/74         Constitutional:  Dave Bond is oriented to place, person and time ,appears well-developed and well-nourished  HEENT:  Atraumatic and normocephalic, external ears normal bilaterally, nose normal no oropharyngeal exudate and is clear and moist  Eyes:  EOCM normal; conjunctivae normal; PERRLA bilaterally  Neck:  Normal range of motion, neck supple, no JVD and no thyromegaly  Cardiovascular:  RRR, normal heart sounds and intact distal pulses  Pulmonary:  effort normal and breath sounds normal bilaterally,no wheezes or rales, no respiratory distress  Abdominal:  Soft, non-tender; normal bowel sounds, no masses  Musculoskeletal:  Normal range of motion and no edema or tenderness bilaterally  No lymphadenopathy  Neurological:  alert, oriented, and normal reflexes bilaterally  Skin: warm and dryAnd erythematous rash on the dorsum of the hands and inside of the ankles  Psychiatric:  normal mood and effect; behavior normal.    Labs:   No results found for: LABA1C  Lab Results   Component Value Date    CHOL 194 12/11/2014     Lab Results   Component Value Date    HDL 43 12/11/2014     Lab Results   Component Value Date    LDLCALC 111 12/11/2014     Lab Results   Component Value Date    TRIG 198 12/11/2014     No components found for: CHOLHDL  Lab Results   Component Value Date    WBC 8.8 12/01/2016    HGB 13.9 12/01/2016    HCT 41.8 12/01/2016    MCV 87.4 12/01/2016     12/01/2016     No results found for: INR, PROTIME  Lab Results   Component Value Date    GLUCOSE 104 (H) 12/01/2016 CREATININE 0.54 12/01/2016    BUN 13 12/01/2016     12/01/2016    K 3.7 12/01/2016    CL 99 12/01/2016    CO2 28 12/01/2016     Lab Results   Component Value Date    ALT 20 12/01/2016    AST 17 12/01/2016    ALKPHOS 84 12/01/2016    BILITOT 0.29 (L) 12/01/2016     Lab Results   Component Value Date    LABALBU 4.2 12/01/2016     No results found for: TSH, CBC  Assessment:  1. Rash    2. Insomnia, unspecified type    3. Contact dermatitis, unspecified contact dermatitis type, unspecified trigger    4. Generalized anxiety disorder        Plan:  Prednisone 10 mg 3 times a day for 5 days  Kenalog ointment apply twice a day and call back in 2 days  Patient is taking Xanax as needed for anxiety and is stable and also patient is sleeping better  Review in 3 months           1. Leisa Ko received counseling on the following healthy behaviors: nutrition and exercise    2. Prior labs and health maintenance reviewed. 3.  Discussed use, benefit, and side effects of prescribed medications. Barriers to medication compliance addressed. All her questions were answered. Pt voiced understanding. Leisa Ko will continue current medications, diet and exercise. Orders Placed This Encounter   Medications    cetirizine-psuedoephedrine (ZYRTEC-D ALLERGY & CONGESTION) 5-120 MG per extended release tablet     Sig: Take 1 tablet by mouth 2 times daily     Dispense:  60 tablet     Refill:  2    ALPRAZolam (XANAX) 0.5 MG tablet     Sig: Take 1 tablet by mouth nightly as needed for Sleep for up to 30 days. TAKE ONE-HALF TABLET BY MOUTH TWO TIMES A DAY.      Dispense:  30 tablet     Refill:  0     Rx can be filled 6/30/2018    DULoxetine (CYMBALTA) 60 MG extended release capsule     Sig: Take 1 capsule by mouth daily     Dispense:  30 capsule     Refill:  2    predniSONE (DELTASONE) 10 MG tablet     Sig: Take 1 tablet by mouth 3 times daily (with meals) for 5 days     Dispense:  15 tablet     Refill:  0    triamcinolone (KENALOG) 0.025 % cream     Sig: Apply topically 2 times daily. Dispense:  1 Tube     Refill:  0          Completed Refills               Requested Prescriptions     Signed Prescriptions Disp Refills    cetirizine-psuedoephedrine (ZYRTEC-D ALLERGY & CONGESTION) 5-120 MG per extended release tablet 60 tablet 2     Sig: Take 1 tablet by mouth 2 times daily    ALPRAZolam (XANAX) 0.5 MG tablet 30 tablet 0     Sig: Take 1 tablet by mouth nightly as needed for Sleep for up to 30 days. TAKE ONE-HALF TABLET BY MOUTH TWO TIMES A DAY.  DULoxetine (CYMBALTA) 60 MG extended release capsule 30 capsule 2     Sig: Take 1 capsule by mouth daily    predniSONE (DELTASONE) 10 MG tablet 15 tablet 0     Sig: Take 1 tablet by mouth 3 times daily (with meals) for 5 days    triamcinolone (KENALOG) 0.025 % cream 1 Tube 0     Sig: Apply topically 2 times daily. 4. Patient given educational materials - see patient instructions    5. Was a self-tracking handout given in paper form or via [employer]? NO    No orders of the defined types were placed in this encounter. Return in about 3 months (around 10/23/2018). Patient voiced understanding and agreed to treatment plan. Electronically signed by Nazario Oviedo MD on 7/23/2018 at 12:23 PM    This note is created with a voice recognition program and while intend to generate a document that accurately reflects the content of the visit, no guarantee can be provided that every mistake has been identified and corrected by editing.

## 2018-08-15 ENCOUNTER — TELEPHONE (OUTPATIENT)
Dept: INTERNAL MEDICINE CLINIC | Age: 38
End: 2018-08-15

## 2018-08-15 DIAGNOSIS — N39.0 URINARY TRACT INFECTION WITHOUT HEMATURIA, SITE UNSPECIFIED: Primary | ICD-10-CM

## 2018-08-15 NOTE — TELEPHONE ENCOUNTER
Patient calling to see if you can pre scribe her something for pain and urgency   she was seen in urgent care for an UTI they pre scribed Bactrim and gave her Phenazopyridine    one tab TID for 2 days to help with pain and urgency but its not helping

## 2018-08-15 NOTE — TELEPHONE ENCOUNTER
Noted  Advise patient to increase the fluid intake  Repeat urinalysis next week with culture and sensitivity

## 2018-08-27 RX ORDER — SPIRONOLACTONE 50 MG/1
TABLET, FILM COATED ORAL
Qty: 60 TABLET | Refills: 3 | Status: SHIPPED | OUTPATIENT
Start: 2018-08-27 | End: 2018-11-21 | Stop reason: SDUPTHER

## 2018-09-06 DIAGNOSIS — G47.00 INSOMNIA, UNSPECIFIED TYPE: ICD-10-CM

## 2018-09-06 RX ORDER — ALPRAZOLAM 0.5 MG/1
0.5 TABLET ORAL NIGHTLY PRN
Qty: 30 TABLET | Refills: 0 | Status: SHIPPED | OUTPATIENT
Start: 2018-09-06 | End: 2018-10-10 | Stop reason: SDUPTHER

## 2018-10-10 DIAGNOSIS — G47.00 INSOMNIA, UNSPECIFIED TYPE: ICD-10-CM

## 2018-10-10 RX ORDER — ALPRAZOLAM 0.5 MG/1
0.5 TABLET ORAL NIGHTLY PRN
Qty: 30 TABLET | Refills: 0 | Status: SHIPPED | OUTPATIENT
Start: 2018-10-10 | End: 2018-11-21 | Stop reason: SDUPTHER

## 2018-10-11 RX ORDER — ALPRAZOLAM 0.5 MG/1
TABLET ORAL
Qty: 30 TABLET | Refills: 0 | OUTPATIENT
Start: 2018-10-11

## 2018-10-16 DIAGNOSIS — G47.00 INSOMNIA, UNSPECIFIED TYPE: ICD-10-CM

## 2018-10-17 RX ORDER — ALPRAZOLAM 0.5 MG/1
TABLET ORAL
Qty: 30 TABLET | Refills: 0 | OUTPATIENT
Start: 2018-10-17

## 2018-11-12 RX ORDER — DULOXETIN HYDROCHLORIDE 60 MG/1
CAPSULE, DELAYED RELEASE ORAL
Qty: 30 CAPSULE | Refills: 0 | Status: SHIPPED | OUTPATIENT
Start: 2018-11-12 | End: 2018-11-21 | Stop reason: SDUPTHER

## 2018-11-21 ENCOUNTER — OFFICE VISIT (OUTPATIENT)
Dept: INTERNAL MEDICINE CLINIC | Age: 38
End: 2018-11-21
Payer: COMMERCIAL

## 2018-11-21 VITALS
BODY MASS INDEX: 36.75 KG/M2 | DIASTOLIC BLOOD PRESSURE: 64 MMHG | HEART RATE: 101 BPM | OXYGEN SATURATION: 98 % | WEIGHT: 207.4 LBS | SYSTOLIC BLOOD PRESSURE: 128 MMHG | RESPIRATION RATE: 16 BRPM | HEIGHT: 63 IN | TEMPERATURE: 98.6 F

## 2018-11-21 DIAGNOSIS — E66.09 OBESITY DUE TO EXCESS CALORIES, UNSPECIFIED OBESITY SEVERITY: ICD-10-CM

## 2018-11-21 DIAGNOSIS — Z23 NEED FOR INFLUENZA VACCINATION: ICD-10-CM

## 2018-11-21 DIAGNOSIS — F41.9 ANXIETY: Primary | ICD-10-CM

## 2018-11-21 DIAGNOSIS — G47.00 INSOMNIA, UNSPECIFIED TYPE: ICD-10-CM

## 2018-11-21 PROCEDURE — 99214 OFFICE O/P EST MOD 30 MIN: CPT | Performed by: INTERNAL MEDICINE

## 2018-11-21 PROCEDURE — 90688 IIV4 VACCINE SPLT 0.5 ML IM: CPT | Performed by: INTERNAL MEDICINE

## 2018-11-21 PROCEDURE — 90471 IMMUNIZATION ADMIN: CPT | Performed by: INTERNAL MEDICINE

## 2018-11-21 RX ORDER — DULOXETIN HYDROCHLORIDE 60 MG/1
CAPSULE, DELAYED RELEASE ORAL
Qty: 30 CAPSULE | Refills: 5 | Status: SHIPPED | OUTPATIENT
Start: 2018-11-21 | End: 2019-04-09 | Stop reason: SDUPTHER

## 2018-11-21 RX ORDER — LAMOTRIGINE 100 MG/1
300 TABLET ORAL DAILY
Refills: 0 | COMMUNITY
Start: 2018-10-31

## 2018-11-21 RX ORDER — CETIRIZINE HYDROCHLORIDE, PSEUDOEPHEDRINE HYDROCHLORIDE 5; 120 MG/1; MG/1
1 TABLET, FILM COATED, EXTENDED RELEASE ORAL 2 TIMES DAILY
Qty: 60 TABLET | Refills: 5 | Status: SHIPPED | OUTPATIENT
Start: 2018-11-21 | End: 2019-04-09 | Stop reason: SDUPTHER

## 2018-11-21 RX ORDER — SPIRONOLACTONE 50 MG/1
TABLET, FILM COATED ORAL
Qty: 60 TABLET | Refills: 5 | Status: SHIPPED | OUTPATIENT
Start: 2018-11-21 | End: 2019-04-09 | Stop reason: SDUPTHER

## 2018-11-21 RX ORDER — PHENTERMINE HYDROCHLORIDE 37.5 MG/1
37.5 CAPSULE ORAL EVERY MORNING
Qty: 30 CAPSULE | Refills: 0 | Status: SHIPPED | OUTPATIENT
Start: 2018-11-21 | End: 2018-12-21

## 2018-11-21 RX ORDER — ALPRAZOLAM 0.5 MG/1
0.5 TABLET ORAL NIGHTLY PRN
Qty: 30 TABLET | Refills: 0 | Status: SHIPPED | OUTPATIENT
Start: 2018-11-21 | End: 2018-12-20 | Stop reason: SDUPTHER

## 2018-11-21 NOTE — PROGRESS NOTES
SUMAtriptan (IMITREX) 100 MG tablet TAKE ONE TABLET BY MOUTH ONCE AS NEEDED FOR MIGRAINE 9 tablet 4    sucralfate (CARAFATE) 1 GM tablet Take 1 g by mouth 3 times daily For 10 days      omeprazole 20 MG EC tablet Take 20 mg by mouth daily For 20 days      Clindamycin-Benzoyl Per, Refr, 1.2-5 % GEL Apply 1 Tube topically daily 1 Tube 3    SUMAtriptan (IMITREX) 100 MG tablet Take 1 tablet by mouth once as needed for up to 1 dose. 9 tablet 5     No current facility-administered medications for this visit.         No Known Allergies    Past Medical History:   Diagnosis Date    Acne     Anxiety     Atrial septal defect     Depression     GERD (gastroesophageal reflux disease)     Hypercholesteremia     Hypertension     Migraine     Seasonal allergies     SVT [facility]supra[facility]ricular tachycardia) ([facility])        Past Surgical History:   Procedure Laterality Date    ASD REPAIR  2/16/00    CARDIAC SURGERY      CARPAL TUNNEL RELEASE Right     HERNIA REPAIR  05/17/2016    HYSTERECTOMY  05/17/2016       Family History   Problem Relation Age of Onset    Diabetes Other     Cancer Other     Asthma Other     Heart Disease Other     Migraines Other      ROS   Constitutional:  Negative for fatigue, loss of appetite and unexpected weight change   HEENT            : Negative for neck stiffness and pain, no congestion or sinus pressure   Eyes                : No visual disturbance or pain   Cardiovascular: No chest pain or palpitations or leg swelling   Respiratory      : Negative for cough, shortness of breath or wheezing   Gastrointestinal: Negative for abdominal pain, constipation or diarrhea and bloating No nausea or vomiting   Genitourinary:     No urgency or frequency, no burning or hematuria   Musculoskeletal: No arthralgias, back pain or myalgias   Skin                  : Negative for rash or erythema   Neurological    : Negative for dizziness, weakness, tremors ,light headedness or syncope   Psychiatric understanding and agreed to treatment plan. Electronically signed by Lucila Kurtz MD on 11/21/2018 at 1:48 PM    This note is created with a voice recognition program and while intend to generate a document that accurately reflects the content of the visit, no guarantee can be provided that every mistake has been identified and corrected by editing.

## 2018-12-19 ENCOUNTER — TELEPHONE (OUTPATIENT)
Dept: INTERNAL MEDICINE CLINIC | Age: 38
End: 2018-12-19

## 2018-12-20 ENCOUNTER — NURSE ONLY (OUTPATIENT)
Dept: INTERNAL MEDICINE CLINIC | Age: 38
End: 2018-12-20
Payer: COMMERCIAL

## 2018-12-20 DIAGNOSIS — G47.00 INSOMNIA, UNSPECIFIED TYPE: ICD-10-CM

## 2018-12-20 DIAGNOSIS — R49.1 LOSS OF VOICE: Primary | ICD-10-CM

## 2018-12-20 LAB — S PYO AG THROAT QL: NORMAL

## 2018-12-20 PROCEDURE — 87880 STREP A ASSAY W/OPTIC: CPT | Performed by: INTERNAL MEDICINE

## 2018-12-21 RX ORDER — ALPRAZOLAM 0.5 MG/1
TABLET ORAL
Qty: 7 TABLET | Refills: 0 | Status: SHIPPED | OUTPATIENT
Start: 2018-12-21 | End: 2018-12-26 | Stop reason: SDUPTHER

## 2018-12-21 RX ORDER — ALPRAZOLAM 0.5 MG/1
TABLET ORAL NIGHTLY PRN
Qty: 30 TABLET | Refills: 0 | OUTPATIENT
Start: 2018-12-21 | End: 2019-01-20

## 2018-12-26 DIAGNOSIS — G47.00 INSOMNIA, UNSPECIFIED TYPE: ICD-10-CM

## 2019-01-02 RX ORDER — ALPRAZOLAM 0.5 MG/1
0.25 TABLET ORAL NIGHTLY PRN
Qty: 7 TABLET | Refills: 0 | Status: SHIPPED | OUTPATIENT
Start: 2019-01-02 | End: 2019-01-03 | Stop reason: CLARIF

## 2019-01-03 DIAGNOSIS — G47.00 INSOMNIA, UNSPECIFIED TYPE: ICD-10-CM

## 2019-01-03 RX ORDER — ALPRAZOLAM 0.5 MG/1
0.25 TABLET ORAL NIGHTLY PRN
Qty: 30 TABLET | Refills: 0 | OUTPATIENT
Start: 2019-01-03 | End: 2019-01-31 | Stop reason: SDUPTHER

## 2019-01-31 DIAGNOSIS — G47.00 INSOMNIA, UNSPECIFIED TYPE: ICD-10-CM

## 2019-02-01 RX ORDER — ALPRAZOLAM 0.5 MG/1
TABLET ORAL
Qty: 30 TABLET | Refills: 0 | Status: SHIPPED | OUTPATIENT
Start: 2019-02-02 | End: 2019-03-04

## 2019-02-01 RX ORDER — TRIAMCINOLONE ACETONIDE 0.25 MG/G
CREAM TOPICAL
Qty: 15 G | Refills: 1 | Status: SHIPPED | OUTPATIENT
Start: 2019-02-01 | End: 2019-04-09

## 2019-02-07 RX ORDER — TAZAROTENE 1 MG/G
CREAM TOPICAL
OUTPATIENT
Start: 2019-02-07

## 2019-03-07 DIAGNOSIS — F41.9 ANXIETY: Primary | ICD-10-CM

## 2019-03-08 RX ORDER — ALPRAZOLAM 0.5 MG/1
TABLET ORAL
Qty: 30 TABLET | Refills: 0 | Status: SHIPPED | OUTPATIENT
Start: 2019-03-08 | End: 2019-04-09 | Stop reason: SDUPTHER

## 2019-04-05 RX ORDER — SUMATRIPTAN 100 MG/1
TABLET, FILM COATED ORAL
Qty: 9 TABLET | Refills: 0 | Status: SHIPPED | OUTPATIENT
Start: 2019-04-05 | End: 2019-04-09 | Stop reason: SDUPTHER

## 2019-04-09 ENCOUNTER — OFFICE VISIT (OUTPATIENT)
Dept: INTERNAL MEDICINE CLINIC | Age: 39
End: 2019-04-09
Payer: COMMERCIAL

## 2019-04-09 VITALS — HEIGHT: 63 IN | WEIGHT: 213 LBS | BODY MASS INDEX: 37.74 KG/M2 | TEMPERATURE: 98.1 F

## 2019-04-09 DIAGNOSIS — E66.09 OBESITY DUE TO EXCESS CALORIES, UNSPECIFIED OBESITY SEVERITY: Primary | ICD-10-CM

## 2019-04-09 DIAGNOSIS — N39.3 STRESS INCONTINENCE: ICD-10-CM

## 2019-04-09 DIAGNOSIS — F41.9 ANXIETY: ICD-10-CM

## 2019-04-09 PROCEDURE — 99214 OFFICE O/P EST MOD 30 MIN: CPT | Performed by: INTERNAL MEDICINE

## 2019-04-09 RX ORDER — SPIRONOLACTONE 50 MG/1
TABLET, FILM COATED ORAL
Qty: 60 TABLET | Refills: 5 | Status: SHIPPED | OUTPATIENT
Start: 2019-04-09 | End: 2019-12-01 | Stop reason: SDUPTHER

## 2019-04-09 RX ORDER — ALPRAZOLAM 0.5 MG/1
TABLET ORAL
Qty: 30 TABLET | Refills: 0 | Status: SHIPPED | OUTPATIENT
Start: 2019-04-09 | End: 2019-05-20 | Stop reason: SDUPTHER

## 2019-04-09 RX ORDER — DULOXETIN HYDROCHLORIDE 60 MG/1
CAPSULE, DELAYED RELEASE ORAL
Qty: 30 CAPSULE | Refills: 5 | Status: SHIPPED | OUTPATIENT
Start: 2019-04-09 | End: 2019-07-31 | Stop reason: SDUPTHER

## 2019-04-09 RX ORDER — SUMATRIPTAN 100 MG/1
TABLET, FILM COATED ORAL
Qty: 9 TABLET | Refills: 0 | Status: SHIPPED | OUTPATIENT
Start: 2019-04-09 | End: 2019-07-31 | Stop reason: SDUPTHER

## 2019-04-09 RX ORDER — CETIRIZINE HYDROCHLORIDE, PSEUDOEPHEDRINE HYDROCHLORIDE 5; 120 MG/1; MG/1
1 TABLET, FILM COATED, EXTENDED RELEASE ORAL 2 TIMES DAILY
Qty: 60 TABLET | Refills: 5 | Status: SHIPPED | OUTPATIENT
Start: 2019-04-09 | End: 2019-12-10 | Stop reason: SDUPTHER

## 2019-04-09 NOTE — PROGRESS NOTES
Visit Information    Have you changed or started any medications since your last visit including any over-the-counter medicines, vitamins, or herbal medicines? no   Are you having any side effects from any of your medications? -  no  Have you stopped taking any of your medications? Is so, why? -  no    Have you seen any other physician or provider since your last visit? Yes - Records Obtained  Have you had any other diagnostic tests since your last visit? No  Have you been seen in the emergency room and/or had an admission to a hospital since we last saw you? No  Have you had your routine dental cleaning in the past 6 months? yes     Have you activated your [employer] account? If not, what are your barriers?  Yes     Patient Care Team:  Lino Arredondo MD as PCP - General (Internal Medicine)  Shwetha Nur MD as Consulting Physician (Gastroenterology)    Medical History Review  Past Medical, Family, and Social History reviewed and does not contribute to the patient presenting condition    Health Maintenance   Topic Date Due    HIV screen  01/30/1993    DTaP/Tdap/Td vaccine (1 - Tdap) 01/30/1997    Cervical cancer screen  10/10/2016    Potassium monitoring  12/01/2017    Creatinine monitoring  12/01/2017    Diabetes screen  01/30/2018    Lipid screen  12/11/2019    Flu vaccine  Completed    Pneumococcal 0-64 years Vaccine  Aged Out

## 2019-04-09 NOTE — PROGRESS NOTES
Omar Sanderson is a 37 y.o. female who presents for   Chief Complaint   Patient presents with    Medication Check     recheck on xanax also wants to start adipex this week. Prescription is filled but didn't take it, wants to know if it is ok to take   [address]     will discuss with provider    and follow up of chronic medical problems. Patient Active Problem List   Diagnosis    Migraine    Acne    Hypercholesteremia    Atrial septal defect    [facility]    Depression    Anxiety    Seasonal allergies    Chronic shoulder pain    Sprain, knee    Cervical strain    Chronic rhinitis     HPI  Here for follow-up on anxiety and obesity and patient states she did not start the Adipex because she had a procedure done for his stress incontinence and had problems and so she never started the pill and wants to try it now    Current Outpatient Medications   Medication Sig Dispense Refill    cetirizine-psuedoephedrine (ZYRTEC-D ALLERGY & CONGESTION) 5-120 MG per extended release tablet Take 1 tablet by mouth 2 times daily 60 tablet 5    DULoxetine (CYMBALTA) 60 MG extended release capsule TAKE ONE CAPSULE BY MOUTH DAILY 30 capsule 5    spironolactone (ALDACTONE) 50 MG tablet TAKE ONE TABLET BY MOUTH TWICE A DAY 60 tablet 5    SUMAtriptan (IMITREX) 100 MG tablet TAKE ONE TABLET BY MOUTH ONCE AS NEEDED FOR MIGRAINE 9 tablet 0    ALPRAZolam (XANAX) 0.5 MG tablet TAKE ONE-HALF TABLET BY MOUTH TWICE A DAY 30 tablet 0    Clindamycin-Benzoyl Per, Refr, 1.2-5 % GEL Apply 1 Tube topically daily 1 Tube 3    TAZORAC 0.1 % cream       lamoTRIgine (LAMICTAL) 100 MG tablet TAKE 1 AND 1/2 TABLET BY MOUTH ONCE DAILY  0     No current facility-administered medications for this visit.         No Known Allergies    Past Medical History:   Diagnosis Date    Acne     Anxiety     Atrial septal defect     Depression     GERD (gastroesophageal reflux disease)     Hypercholesteremia  Hypertension     Migraine     Seasonal allergies     SVT (supraventricular tachycardia) (HCC)        Past Surgical History:   Procedure Laterality Date    ASD REPAIR  2/16/00    CARDIAC SURGERY      CARPAL TUNNEL RELEASE Right     HERNIA REPAIR  05/17/2016    HYSTERECTOMY  05/17/2016       Family History   Problem Relation Age of Onset    Diabetes Other     Cancer Other     Asthma Other     Heart Disease Other     Migraines Other      ROS   Constitutional:  Negative for fatigue, loss of appetite and unexpected weight change   HEENT            : Negative for neck stiffness and pain, no congestion or sinus pressure   Eyes                : No visual disturbance or pain   Cardiovascular: No chest pain or palpitations or leg swelling   Respiratory      : Negative for cough, shortness of breath or wheezing   Gastrointestinal: Negative for abdominal pain, constipation or diarrhea and bloating No nausea or vomiting   Genitourinary:     No urgency or frequency, no burning or hematuria   Musculoskeletal: No arthralgias, back pain or myalgias   Skin                  : Negative for rash or erythema   Neurological    : Negative for dizziness, weakness, tremors ,light headedness or syncope   Psychiatric       : Negative for dysphoric mood, sleep disturbances, nervous or anxious, or decreased concentration   All other review of systems was negative    Objective  Physical Examination:    Nursing note reviewed    Temp 98.1 °F (36.7 °C) (Oral)   Ht 5' 3\" (1.6 m)   Wt 213 lb (96.6 kg)   LMP 03/30/2015   BMI 37.73 kg/m²   BP Readings from Last 3 Encounters:   11/21/18 128/64   07/23/18 128/82   03/23/18 (!) 155/89         Constitutional:  Dave Daniel is oriented to place, person and time ,appears well-developed and well-nourished  HEENT:  Atraumatic and normocephalic, external ears normal bilaterally, nose normal no oropharyngeal exudate and is clear and moist  Eyes:  EOCM normal; conjunctivae normal; PERRLA bilaterally  Neck:  Normal range of motion, neck supple, no JVD and no thyromegaly  Cardiovascular:  RRR, normal heart sounds and intact distal pulses  Pulmonary:  effort normal and breath sounds normal bilaterally,no wheezes or rales, no respiratory distress  Abdominal:  Soft, non-tender; normal bowel sounds, no masses  Musculoskeletal:  Normal range of motion and no edema or tenderness bilaterally  No lymphadenopathy  Neurological:  alert, oriented, and normal reflexes bilaterally  Skin: warm and dry  Psychiatric:  normal mood and effect; behavior normal.    Labs:   No results found for: LABA1C  Lab Results   Component Value Date    CHOL 194 12/11/2014     Lab Results   Component Value Date    HDL 43 12/11/2014     Lab Results   Component Value Date    LDLCALC 111 12/11/2014     Lab Results   Component Value Date    TRIG 198 12/11/2014     No components found for: [place], [place]  Lab Results   Component Value Date    WBC 8.8 12/01/2016    HGB 13.9 12/01/2016    HCT 41.8 12/01/2016    MCV 87.4 12/01/2016     12/01/2016     No results found for: INR, PROTIME  Lab Results   Component Value Date    GLUCOSE 104 (H) 12/01/2016    CREATININE 0.54 12/01/2016    BUN 13 12/01/2016     12/01/2016    K 3.7 12/01/2016    CL 99 12/01/2016    CO2 28 12/01/2016     Lab Results   Component Value Date    ALT 20 12/01/2016    AST 17 12/01/2016    ALKPHOS 84 12/01/2016    BILITOT 0.29 (L) 12/01/2016     Lab Results   Component Value Date    LABALBU 4.2 12/01/2016     No results found for: TSH, CBC  Assessment:  1. Obesity due to excess calories, unspecified obesity severity    2. Anxiety    3.  Stress incontinence        Plan:  Patient did not start the Adipex as recommended and patient picked up the prescription but did not take the medicine and wants to do it now and a urine drug screen done today in the office and advised patient to start Adipex and review in 1 month  Patient taking Xanax for anxiety and is stable and refills given  Patient's visit to the hospital regarding urinary incontinence and had a ring placed and subsequent urinary retention and Rocha catheter placement and subsequent follow-up with urology and catheter removal all reviewed and patient currently doing well without any problems with the urination  Review in 1 month           1.  [facility] received counseling on the following healthy behaviors: nutrition and exercise    2. Prior labs and health maintenance reviewed. 3.  Discussed use, benefit, and side effects of prescribed medications. Barriers to medication compliance addressed. All her questions were answered. Pt voiced understanding. [facility] will continue current medications, diet and exercise.               Orders Placed This Encounter   Medications    cetirizine-psuedoephedrine (ZYRTEC-D ALLERGY & CONGESTION) 5-120 MG per extended release tablet     Sig: Take 1 tablet by mouth 2 times daily     Dispense:  60 tablet     Refill:  5    DULoxetine (CYMBALTA) 60 MG extended release capsule     Sig: TAKE ONE CAPSULE BY MOUTH DAILY     Dispense:  30 capsule     Refill:  5    spironolactone (ALDACTONE) 50 MG tablet     Sig: TAKE ONE TABLET BY MOUTH TWICE A DAY     Dispense:  60 tablet     Refill:  5    SUMAtriptan (IMITREX) 100 MG tablet     Sig: TAKE ONE TABLET BY MOUTH ONCE AS NEEDED FOR MIGRAINE     Dispense:  9 tablet     Refill:  0    ALPRAZolam (XANAX) 0.5 MG tablet     Sig: TAKE ONE-HALF TABLET BY MOUTH TWICE A DAY     Dispense:  30 tablet     Refill:  0          Completed Refills               Requested Prescriptions     Signed Prescriptions Disp Refills    cetirizine-psuedoephedrine (ZYRTEC-D ALLERGY & CONGESTION) 5-120 MG per extended release tablet 60 tablet 5     Sig: Take 1 tablet by mouth 2 times daily    DULoxetine (CYMBALTA) 60 MG extended release capsule 30 capsule 5     Sig: TAKE ONE CAPSULE BY MOUTH DAILY    spironolactone (ALDACTONE) 50 MG tablet 60 tablet 5     Sig: TAKE ONE TABLET BY MOUTH TWICE A DAY    SUMAtriptan (IMITREX) 100 MG tablet 9 tablet 0     Sig: TAKE ONE TABLET BY MOUTH ONCE AS NEEDED FOR MIGRAINE    ALPRAZolam (XANAX) 0.5 MG tablet 30 tablet 0     Sig: TAKE ONE-HALF TABLET BY MOUTH TWICE A DAY     4. Patient given educational materials - see patient instructions    5. Was a self-tracking handout given in paper form or via [employer]? NO    No orders of the defined types were placed in this encounter. Return in about 1 month (around 5/9/2019). Patient voiced understanding and agreed to treatment plan. Electronically signed by Henry Lopez MD on 4/9/2019 at 4:18 PM    This note is created with a voice recognition program and while intend to generate a document that accurately reflects the content of the visit, no guarantee can be provided that every mistake has been identified and corrected by editing.

## 2019-05-03 ENCOUNTER — TELEPHONE (OUTPATIENT)
Dept: INTERNAL MEDICINE CLINIC | Age: 39
End: 2019-05-03

## 2019-05-03 NOTE — TELEPHONE ENCOUNTER
Patient calling she is on her second wk of Adipex and his experiencing some Headaches, dizziness and Nausea  She wanted to know if the symptoms subside.  Please advise

## 2019-05-20 DIAGNOSIS — F41.9 ANXIETY: ICD-10-CM

## 2019-05-20 RX ORDER — ALPRAZOLAM 0.5 MG/1
TABLET ORAL
Qty: 30 TABLET | Refills: 0 | Status: SHIPPED | OUTPATIENT
Start: 2019-05-20 | End: 2019-06-24 | Stop reason: SDUPTHER

## 2019-06-21 DIAGNOSIS — F41.9 ANXIETY: ICD-10-CM

## 2019-06-24 RX ORDER — ALPRAZOLAM 0.5 MG/1
TABLET ORAL
Qty: 30 TABLET | Refills: 0 | Status: SHIPPED | OUTPATIENT
Start: 2019-06-24 | End: 2019-07-21

## 2019-07-31 ENCOUNTER — OFFICE VISIT (OUTPATIENT)
Dept: INTERNAL MEDICINE CLINIC | Age: 39
End: 2019-07-31
Payer: COMMERCIAL

## 2019-07-31 VITALS
HEIGHT: 63 IN | TEMPERATURE: 98 F | HEART RATE: 96 BPM | SYSTOLIC BLOOD PRESSURE: 118 MMHG | OXYGEN SATURATION: 98 % | WEIGHT: 207 LBS | DIASTOLIC BLOOD PRESSURE: 70 MMHG | BODY MASS INDEX: 36.68 KG/M2

## 2019-07-31 DIAGNOSIS — F32.A DEPRESSION, UNSPECIFIED DEPRESSION TYPE: ICD-10-CM

## 2019-07-31 DIAGNOSIS — E66.09 OBESITY DUE TO EXCESS CALORIES, UNSPECIFIED OBESITY SEVERITY: ICD-10-CM

## 2019-07-31 DIAGNOSIS — F41.9 ANXIETY: Primary | ICD-10-CM

## 2019-07-31 PROCEDURE — 99214 OFFICE O/P EST MOD 30 MIN: CPT | Performed by: INTERNAL MEDICINE

## 2019-07-31 RX ORDER — DULOXETIN HYDROCHLORIDE 60 MG/1
CAPSULE, DELAYED RELEASE ORAL
Qty: 30 CAPSULE | Refills: 5 | Status: SHIPPED | OUTPATIENT
Start: 2019-07-31 | End: 2020-03-10 | Stop reason: SDUPTHER

## 2019-07-31 RX ORDER — ALPRAZOLAM 0.5 MG/1
0.5 TABLET ORAL NIGHTLY PRN
Qty: 30 TABLET | Refills: 0 | Status: SHIPPED | OUTPATIENT
Start: 2019-07-31 | End: 2019-09-06 | Stop reason: SDUPTHER

## 2019-07-31 RX ORDER — ALPRAZOLAM 0.5 MG/1
0.5 TABLET ORAL
COMMUNITY
End: 2019-07-31 | Stop reason: SDUPTHER

## 2019-07-31 RX ORDER — SUMATRIPTAN 100 MG/1
TABLET, FILM COATED ORAL
Qty: 9 TABLET | Refills: 0 | Status: SHIPPED | OUTPATIENT
Start: 2019-07-31 | End: 2020-01-07

## 2019-07-31 NOTE — PROGRESS NOTES
SVT (supraventricular tachycardia) (HCC)        Past Surgical History:   Procedure Laterality Date    ASD REPAIR  2/16/00    CARDIAC SURGERY      CARPAL TUNNEL RELEASE Right     HERNIA REPAIR  05/17/2016    HYSTERECTOMY  05/17/2016       Family History   Problem Relation Age of Onset    Diabetes Other     Cancer Other     Asthma Other     Heart Disease Other     Migraines Other      ROS   Constitutional:  Negative for fatigue, loss of appetite and unexpected weight change   HEENT            : Negative for neck stiffness and pain, no congestion or sinus pressure   Eyes                : No visual disturbance or pain   Cardiovascular: No chest pain or palpitations or leg swelling   Respiratory      : Negative for cough, shortness of breath or wheezing   Gastrointestinal: Negative for abdominal pain, constipation or diarrhea and bloating No nausea or vomiting   Genitourinary:     No urgency or frequency, no burning or hematuria   Musculoskeletal: No arthralgias, back pain or myalgias   Skin                  : Negative for rash or erythema   Neurological    : Negative for dizziness, weakness, tremors ,light headedness or syncope   Psychiatric       : Negative for dysphoric mood, sleep disturbances, nervous or anxious, or decreased concentration   All other review of systems was negative    Objective  Physical Examination:    Nursing note reviewed    /70 (Site: Left Upper Arm, Position: Sitting, Cuff Size: Medium Adult)   Pulse 96   Temp 98 °F (36.7 °C)   Ht 5' 2.99\" (1.6 m)   Wt 207 lb (93.9 kg)   LMP 03/30/2015   SpO2 98%   BMI 36.68 kg/m²   BP Readings from Last 3 Encounters:   07/31/19 118/70   11/21/18 128/64   07/23/18 128/82         Constitutional:  Shira Sniff is oriented to place, person and time ,appears well-developed and well-nourished  HEENT:  Atraumatic and normocephalic, external ears normal bilaterally, nose normal no oropharyngeal exudate and is clear and moist  Eyes:  EOCM and [place] and patient will call me back and patient lost about 7 pounds since the last visit  Review in 4 months           1. Chilango Covarrubias received counseling on the following healthy behaviors: nutrition and exercise    2. Prior labs and health maintenance reviewed. 3.  Discussed use, benefit, and side effects of prescribed medications. Barriers to medication compliance addressed. All her questions were answered. Pt voiced understanding. Chilango Covarrubias will continue current medications, diet and exercise. Orders Placed This Encounter   Medications    ALPRAZolam (XANAX) 0.5 MG tablet     Sig: Take 1 tablet by mouth nightly as needed for Sleep for up to 30 days. Dispense:  30 tablet     Refill:  0    SUMAtriptan (IMITREX) 100 MG tablet     Sig: TAKE ONE TABLET BY MOUTH ONCE AS NEEDED FOR MIGRAINE     Dispense:  9 tablet     Refill:  0    DULoxetine (CYMBALTA) 60 MG extended release capsule     Sig: TAKE ONE CAPSULE BY MOUTH DAILY     Dispense:  30 capsule     Refill:  5          Completed Refills               Requested Prescriptions     Signed Prescriptions Disp Refills    ALPRAZolam (XANAX) 0.5 MG tablet 30 tablet 0     Sig: Take 1 tablet by mouth nightly as needed for Sleep for up to 30 days.  SUMAtriptan (IMITREX) 100 MG tablet 9 tablet 0     Sig: TAKE ONE TABLET BY MOUTH ONCE AS NEEDED FOR MIGRAINE    DULoxetine (CYMBALTA) 60 MG extended release capsule 30 capsule 5     Sig: TAKE ONE CAPSULE BY MOUTH DAILY     4. Patient given educational materials - see patient instructions    5. Was a self-tracking handout given in paper form or via [employer]? NO    No orders of the defined types were placed in this encounter. Return in about 4 months (around 11/30/2019). Patient voiced understanding and agreed to treatment plan.      Electronically signed by Amara Hairston MD on 7/31/2019 at 4:55 PM    This note is created with a voice recognition program and while intend to generate a document

## 2019-08-02 ENCOUNTER — TELEPHONE (OUTPATIENT)
Dept: INTERNAL MEDICINE CLINIC | Age: 39
End: 2019-08-02

## 2019-09-06 DIAGNOSIS — F41.9 ANXIETY: ICD-10-CM

## 2019-09-06 RX ORDER — ALPRAZOLAM 0.5 MG/1
0.5 TABLET ORAL NIGHTLY PRN
Qty: 30 TABLET | Refills: 0 | Status: SHIPPED | OUTPATIENT
Start: 2019-09-06 | End: 2019-10-06

## 2019-10-14 ENCOUNTER — TELEPHONE (OUTPATIENT)
Dept: INTERNAL MEDICINE CLINIC | Age: 39
End: 2019-10-14

## 2019-10-14 DIAGNOSIS — G47.00 INSOMNIA, UNSPECIFIED TYPE: Primary | ICD-10-CM

## 2019-10-14 RX ORDER — ALPRAZOLAM 0.5 MG/1
0.5 TABLET ORAL NIGHTLY PRN
Qty: 30 TABLET | Refills: 0 | Status: SHIPPED | OUTPATIENT
Start: 2019-11-13 | End: 2019-11-26 | Stop reason: SDUPTHER

## 2019-10-14 RX ORDER — ALPRAZOLAM 0.5 MG/1
TABLET ORAL
Qty: 30 TABLET | Refills: 0 | Status: SHIPPED | OUTPATIENT
Start: 2019-10-14 | End: 2019-11-26 | Stop reason: CLARIF

## 2019-10-16 DIAGNOSIS — G47.00 INSOMNIA, UNSPECIFIED TYPE: ICD-10-CM

## 2019-10-21 RX ORDER — ALPRAZOLAM 0.5 MG/1
TABLET ORAL
Qty: 30 TABLET | Refills: 0 | OUTPATIENT
Start: 2019-10-21 | End: 2019-11-20

## 2019-11-07 ENCOUNTER — NURSE TRIAGE (OUTPATIENT)
Dept: OTHER | Facility: CLINIC | Age: 39
End: 2019-11-07

## 2019-11-26 DIAGNOSIS — G47.00 INSOMNIA, UNSPECIFIED TYPE: ICD-10-CM

## 2019-11-26 RX ORDER — ALPRAZOLAM 0.5 MG/1
0.5 TABLET ORAL NIGHTLY PRN
Qty: 30 TABLET | Refills: 0 | OUTPATIENT
Start: 2019-11-26 | End: 2020-01-13 | Stop reason: SDUPTHER

## 2019-12-04 RX ORDER — SPIRONOLACTONE 50 MG/1
TABLET, FILM COATED ORAL
Qty: 60 TABLET | Refills: 0 | Status: SHIPPED | OUTPATIENT
Start: 2019-12-04 | End: 2020-01-14

## 2019-12-10 RX ORDER — CETIRIZINE HYDROCHLORIDE, PSEUDOEPHEDRINE HYDROCHLORIDE 5; 120 MG/1; MG/1
TABLET, FILM COATED, EXTENDED RELEASE ORAL
Qty: 60 TABLET | Refills: 4 | Status: SHIPPED | OUTPATIENT
Start: 2019-12-10 | End: 2020-03-10 | Stop reason: SDUPTHER

## 2020-01-07 RX ORDER — SUMATRIPTAN 100 MG/1
TABLET, FILM COATED ORAL
Qty: 9 TABLET | Refills: 0 | Status: SHIPPED | OUTPATIENT
Start: 2020-01-07 | End: 2020-03-10 | Stop reason: SDUPTHER

## 2020-01-13 RX ORDER — ALPRAZOLAM 0.5 MG/1
0.5 TABLET ORAL NIGHTLY PRN
Qty: 30 TABLET | Refills: 0 | Status: SHIPPED | OUTPATIENT
Start: 2020-01-13 | End: 2020-03-18 | Stop reason: SDUPTHER

## 2020-01-13 NOTE — TELEPHONE ENCOUNTER
Last filled 11/26/19 #30 with 0 RF  Last seen 7/31/19    Next Visit Date:  Future Appointments   Date Time Provider Viola Suzi   2/4/2020  3:15 PM Kanika Forde MD [facility] [address] Maintenance   Topic Date Due    DTaP/Tdap/Td vaccine (1 - Tdap) 01/30/1989    HIV screen  01/30/1993    Cervical cancer screen  10/10/2016    Potassium monitoring  12/01/2017    Creatinine monitoring  12/01/2017    Diabetes screen  01/30/2018    Flu vaccine (1) 09/01/2019    Lipid screen  12/11/2019    Pneumococcal 0-64 years Vaccine  Aged Out       No results found for: LABA1C          ( goal A1C is < 7)   No results found for: LABMICR  LDL Calculated (mg/dL)   Date Value   12/11/2014 111   07/05/2013 134       (goal LDL is <100)   AST (U/L)   Date Value   12/01/2016 17     ALT (U/L)   Date Value   12/01/2016 20     BUN (mg/dL)   Date Value   12/01/2016 13     BP Readings from Last 3 Encounters:   07/31/19 118/70   11/21/18 128/64   07/23/18 128/82          (goal 120/80)    All Future Testing planned in [facility]  Lab Frequency Next Occurrence               Patient Active Problem List:     Migraine     Acne     Hypercholesteremia     Atrial septal defect     SVT (supraventricular tachycardia) (HCC)     Depression     Anxiety     Seasonal allergies     Chronic shoulder pain     Sprain, knee     Cervical strain     Chronic rhinitis

## 2020-01-14 RX ORDER — SPIRONOLACTONE 50 MG/1
TABLET, FILM COATED ORAL
Qty: 60 TABLET | Refills: 0 | Status: SHIPPED | OUTPATIENT
Start: 2020-01-14 | End: 2020-03-10 | Stop reason: SDUPTHER

## 2020-03-10 ENCOUNTER — OFFICE VISIT (OUTPATIENT)
Dept: INTERNAL MEDICINE CLINIC | Age: 40
End: 2020-03-10
Payer: COMMERCIAL

## 2020-03-10 VITALS
HEART RATE: 106 BPM | DIASTOLIC BLOOD PRESSURE: 88 MMHG | BODY MASS INDEX: 36.04 KG/M2 | TEMPERATURE: 97 F | OXYGEN SATURATION: 98 % | WEIGHT: 203.4 LBS | SYSTOLIC BLOOD PRESSURE: 134 MMHG | HEIGHT: 63 IN

## 2020-03-10 PROCEDURE — 99214 OFFICE O/P EST MOD 30 MIN: CPT | Performed by: INTERNAL MEDICINE

## 2020-03-10 RX ORDER — CETIRIZINE HYDROCHLORIDE, PSEUDOEPHEDRINE HYDROCHLORIDE 5; 120 MG/1; MG/1
TABLET, FILM COATED, EXTENDED RELEASE ORAL
Qty: 60 TABLET | Refills: 5 | Status: SHIPPED | OUTPATIENT
Start: 2020-03-10 | End: 2020-07-30 | Stop reason: SDUPTHER

## 2020-03-10 RX ORDER — SUMATRIPTAN 100 MG/1
TABLET, FILM COATED ORAL
Qty: 9 TABLET | Refills: 2 | Status: SHIPPED | OUTPATIENT
Start: 2020-03-10 | End: 2020-09-15 | Stop reason: SDUPTHER

## 2020-03-10 RX ORDER — SPIRONOLACTONE 50 MG/1
50 TABLET, FILM COATED ORAL 2 TIMES DAILY
Qty: 60 TABLET | Refills: 0 | Status: SHIPPED | OUTPATIENT
Start: 2020-03-10

## 2020-03-10 RX ORDER — DULOXETIN HYDROCHLORIDE 60 MG/1
CAPSULE, DELAYED RELEASE ORAL
Qty: 30 CAPSULE | Refills: 5 | Status: SHIPPED | OUTPATIENT
Start: 2020-03-10 | End: 2020-07-30 | Stop reason: SDUPTHER

## 2020-03-10 RX ORDER — PROMETHAZINE HYDROCHLORIDE 12.5 MG/1
12.5 TABLET ORAL 4 TIMES DAILY PRN
Qty: 20 TABLET | Refills: 0 | Status: SHIPPED | OUTPATIENT
Start: 2020-03-10 | End: 2020-03-17

## 2020-03-10 NOTE — PROGRESS NOTES
Rosy Bar is a 41 y.o. female who presents for   Chief Complaint   Patient presents with    Nausea & Vomiting     Nausea started Saturday night and vomiting started Saturday afternoon. Cant keep any food down or medications. Has heaches but has ceased now.  Diarrhea     Since Sunday at 9 a.m- awoke to a stomach ache and has not stopped using the restroom .  Medication Refill     meds pended    and follow up of chronic medical problems.   Patient Active Problem List   Diagnosis    Migraine    Acne    Hypercholesteremia    Atrial septal defect    [facility]    Depression    Anxiety    Seasonal allergies    Chronic shoulder pain    Sprain, knee    Cervical strain    Chronic rhinitis     HPI  Here for evaluation of nausea and diarrhea started about 2 days back denies any abdominal pain no diarrhea is better but still nauseous did not throw up the last time she threw up was yesterday and had a bowel movement today one time and was liquidy stool denies any fever or chills    Current Outpatient Medications   Medication Sig Dispense Refill    spironolactone (ALDACTONE) 50 MG tablet Take 1 tablet by mouth 2 times daily TAKE ONE TABLET BY MOUTH TWICE A DAY 60 tablet 0    SUMAtriptan (IMITREX) 100 MG tablet TAKE ONE TABLET BY MOUTH ONCE AT ONSET OF HEADACHE 9 tablet 2    cetirizine-psuedoephedrine (ZYRTEC-D) 5-120 MG per extended release tablet TAKE ONE TABLET BY MOUTH TWICE A DAY 60 tablet 5    DULoxetine (CYMBALTA) 60 MG extended release capsule TAKE ONE CAPSULE BY MOUTH DAILY 30 capsule 5    promethazine (PHENERGAN) 12.5 MG tablet Take 1 tablet by mouth 4 times daily as needed for Nausea 20 tablet 0    naltrexone-bupropion (CONTRAVE) 8-90 MG per extended release tablet 1 tab daily AM first week 1 tab Bid for second week 2 tabs in AM and 1 tab PM for third week 2 tabs Bid for fourth week 84 tablet 0    lamoTRIgine (LAMICTAL) 100 MG tablet Take 300 mg by mouth Date    LABALBU 4.2 12/01/2016     No results found for: TSH, CBC  Assessment:  1. Gastroenteritis    2. Nausea    3. Anxiety    4. Depression, unspecified depression type        Plan:  Advised patient take Imodium as needed and also started on Phenergan for nausea and advised to eat soft diet including chicken broth and bread and increase water intake and Gatorade and call me back in 24 hours if no improvement  Patient is on Xanax for anxiety and is stable and able to function  Continue Cymbalta for depression and refills given  Review as scheduled           1. Shantel Alfaro received counseling on the following healthy behaviors: nutrition and exercise    2. Prior labs and health maintenance reviewed. 3.  Discussed use, benefit, and side effects of prescribed medications. Barriers to medication compliance addressed. All her questions were answered. Pt voiced understanding. Shantel Alfaro will continue current medications, diet and exercise.               Orders Placed This Encounter   Medications    spironolactone (ALDACTONE) 50 MG tablet     Sig: Take 1 tablet by mouth 2 times daily TAKE ONE TABLET BY MOUTH TWICE A DAY     Dispense:  60 tablet     Refill:  0    SUMAtriptan (IMITREX) 100 MG tablet     Sig: TAKE ONE TABLET BY MOUTH ONCE AT ONSET OF HEADACHE     Dispense:  9 tablet     Refill:  2    cetirizine-psuedoephedrine (ZYRTEC-D) 5-120 MG per extended release tablet     Sig: TAKE ONE TABLET BY MOUTH TWICE A DAY     Dispense:  60 tablet     Refill:  5    DULoxetine (CYMBALTA) 60 MG extended release capsule     Sig: TAKE ONE CAPSULE BY MOUTH DAILY     Dispense:  30 capsule     Refill:  5    promethazine (PHENERGAN) 12.5 MG tablet     Sig: Take 1 tablet by mouth 4 times daily as needed for Nausea     Dispense:  20 tablet     Refill:  0          Completed Refills               Requested Prescriptions     Signed Prescriptions Disp Refills    spironolactone (ALDACTONE) 50 MG tablet 60 tablet 0     Sig: Take 1

## 2020-03-18 RX ORDER — ALPRAZOLAM 0.5 MG/1
0.5 TABLET ORAL NIGHTLY PRN
Qty: 30 TABLET | Refills: 0 | Status: SHIPPED | OUTPATIENT
Start: 2020-03-18 | End: 2020-05-15 | Stop reason: SDUPTHER

## 2020-05-15 RX ORDER — ALPRAZOLAM 0.5 MG/1
0.5 TABLET ORAL NIGHTLY PRN
Qty: 30 TABLET | Refills: 0 | Status: SHIPPED | OUTPATIENT
Start: 2020-05-15 | End: 2020-07-30 | Stop reason: SDUPTHER

## 2020-07-28 RX ORDER — ALPRAZOLAM 0.5 MG/1
TABLET ORAL
Qty: 30 TABLET | Refills: 0 | OUTPATIENT
Start: 2020-07-28 | End: 2020-08-28

## 2020-07-30 ENCOUNTER — OFFICE VISIT (OUTPATIENT)
Dept: INTERNAL MEDICINE CLINIC | Age: 40
End: 2020-07-30
Payer: COMMERCIAL

## 2020-07-30 VITALS
DIASTOLIC BLOOD PRESSURE: 86 MMHG | BODY MASS INDEX: 37.8 KG/M2 | HEIGHT: 62 IN | WEIGHT: 205.4 LBS | RESPIRATION RATE: 16 BRPM | SYSTOLIC BLOOD PRESSURE: 116 MMHG | HEART RATE: 92 BPM | TEMPERATURE: 97.4 F

## 2020-07-30 PROCEDURE — 99214 OFFICE O/P EST MOD 30 MIN: CPT | Performed by: INTERNAL MEDICINE

## 2020-07-30 RX ORDER — ALPRAZOLAM 0.5 MG/1
0.5 TABLET ORAL NIGHTLY PRN
Qty: 30 TABLET | Refills: 0 | Status: SHIPPED | OUTPATIENT
Start: 2020-07-30 | End: 2020-11-03 | Stop reason: SDUPTHER

## 2020-07-30 RX ORDER — DULOXETIN HYDROCHLORIDE 60 MG/1
CAPSULE, DELAYED RELEASE ORAL
Qty: 30 CAPSULE | Refills: 5 | Status: SHIPPED | OUTPATIENT
Start: 2020-07-30 | End: 2021-03-22

## 2020-07-30 RX ORDER — CETIRIZINE HYDROCHLORIDE, PSEUDOEPHEDRINE HYDROCHLORIDE 5; 120 MG/1; MG/1
TABLET, FILM COATED, EXTENDED RELEASE ORAL
Qty: 60 TABLET | Refills: 5 | Status: SHIPPED | OUTPATIENT
Start: 2020-07-30 | End: 2021-03-23

## 2020-08-06 ENCOUNTER — TELEPHONE (OUTPATIENT)
Dept: INTERNAL MEDICINE CLINIC | Age: 40
End: 2020-08-06

## 2020-08-06 RX ORDER — TRIAMCINOLONE ACETONIDE 0.25 MG/G
OINTMENT TOPICAL
Qty: 15 G | Refills: 1 | Status: SHIPPED | OUTPATIENT
Start: 2020-08-06 | End: 2020-08-13

## 2020-08-06 NOTE — TELEPHONE ENCOUNTER
I am not sure why the dermatologist did not give her anything even if it is not acne  Can try Kenalog ointment 0.05% apply twice a day and call back next week

## 2020-08-06 NOTE — TELEPHONE ENCOUNTER
Patient is asking for something to be called in for chapped lips? Says its red and burns on and around her lips. She thinks from her mask    Went to her dermatologist they told her it wasn't acne.     please advise

## 2020-09-15 RX ORDER — SUMATRIPTAN 100 MG/1
TABLET, FILM COATED ORAL
Qty: 9 TABLET | Refills: 2 | Status: SHIPPED | OUTPATIENT
Start: 2020-09-15 | End: 2021-09-02 | Stop reason: SDUPTHER

## 2020-09-15 NOTE — TELEPHONE ENCOUNTER
Danni Celeste is calling to request a refill on the following medication(s):    Medication Request:  Requested Prescriptions     Pending Prescriptions Disp Refills    SUMAtriptan (IMITREX) 100 MG tablet 9 tablet 2     Sig: TAKE ONE TABLET BY MOUTH ONCE AT ONSET OF HEADACHE       Last Visit Date (If Applicable):  1/3/8620    Next Visit Date:    12/2/2020

## 2020-11-03 RX ORDER — ALPRAZOLAM 0.5 MG/1
0.5 TABLET ORAL NIGHTLY PRN
Qty: 30 TABLET | Refills: 0 | Status: SHIPPED | OUTPATIENT
Start: 2020-11-03 | End: 2020-12-03

## 2020-11-27 ENCOUNTER — TELEPHONE (OUTPATIENT)
Dept: INTERNAL MEDICINE CLINIC | Age: 40
End: 2020-11-27

## 2020-12-14 ENCOUNTER — TELEPHONE (OUTPATIENT)
Dept: INTERNAL MEDICINE CLINIC | Age: 40
End: 2020-12-14

## 2020-12-14 RX ORDER — AMOXICILLIN AND CLAVULANATE POTASSIUM 875; 125 MG/1; MG/1
1 TABLET, FILM COATED ORAL 2 TIMES DAILY
Qty: 20 TABLET | Refills: 0 | Status: SHIPPED
Start: 2020-12-14 | End: 2020-12-16 | Stop reason: SINTOL

## 2020-12-14 RX ORDER — SULFAMETHOXAZOLE AND TRIMETHOPRIM 400; 80 MG/1; MG/1
1 TABLET ORAL DAILY
Qty: 30 TABLET | Refills: 0 | COMMUNITY
Start: 2020-12-14 | End: 2021-09-02 | Stop reason: ALTCHOICE

## 2020-12-14 NOTE — TELEPHONE ENCOUNTER
Pt called in not well. C/o pressure across face, ear pain, headache since weekend. Stuffy nose. cough with unknown color expectoration. Asking to have something called in for sinus infection. Pt denied chances of being exposed to covid. Denied SOB/Wheezing.     Pt reports she does not tolerate zpack well(emesis)

## 2020-12-16 ENCOUNTER — TELEPHONE (OUTPATIENT)
Dept: INTERNAL MEDICINE CLINIC | Age: 40
End: 2020-12-16

## 2020-12-16 RX ORDER — PENICILLIN V POTASSIUM 500 MG/1
500 TABLET ORAL 3 TIMES DAILY
Qty: 21 TABLET | Refills: 0 | Status: SHIPPED | OUTPATIENT
Start: 2020-12-16 | End: 2020-12-23

## 2020-12-16 NOTE — TELEPHONE ENCOUNTER
Pt called to request change of antibiotic. States augmentin is making her nauseated throughout the day. Reports she has tried taking on full stomach without improvement. Also states she did not do well with zpack in the past(emesis). Please advise.

## 2020-12-23 ENCOUNTER — OFFICE VISIT (OUTPATIENT)
Dept: INTERNAL MEDICINE CLINIC | Age: 40
End: 2020-12-23
Payer: COMMERCIAL

## 2020-12-23 VITALS
WEIGHT: 210 LBS | RESPIRATION RATE: 16 BRPM | BODY MASS INDEX: 37.21 KG/M2 | DIASTOLIC BLOOD PRESSURE: 64 MMHG | TEMPERATURE: 96.9 F | SYSTOLIC BLOOD PRESSURE: 100 MMHG | HEIGHT: 63 IN | HEART RATE: 88 BPM

## 2020-12-23 PROCEDURE — 1111F DSCHRG MED/CURRENT MED MERGE: CPT | Performed by: INTERNAL MEDICINE

## 2020-12-23 PROCEDURE — 99495 TRANSJ CARE MGMT MOD F2F 14D: CPT | Performed by: INTERNAL MEDICINE

## 2020-12-23 RX ORDER — ALPRAZOLAM 0.5 MG/1
0.5 TABLET ORAL NIGHTLY PRN
Qty: 30 TABLET | Refills: 0 | Status: SHIPPED | OUTPATIENT
Start: 2020-12-23 | End: 2021-09-02 | Stop reason: SDUPTHER

## 2020-12-23 RX ORDER — FLUTICASONE PROPIONATE 50 MCG
2 SPRAY, SUSPENSION (ML) NASAL DAILY
Qty: 1 BOTTLE | Refills: 0 | Status: SHIPPED | OUTPATIENT
Start: 2020-12-23 | End: 2021-09-02 | Stop reason: SDUPTHER

## 2020-12-23 NOTE — PROGRESS NOTES
sulfamethoxazole-trimethoprim (BACTRIM) 400-80 MG per tablet  Take 1 tablet by mouth daily             SUMAtriptan (IMITREX) 100 MG tablet  TAKE ONE TABLET BY MOUTH ONCE AT ONSET OF HEADACHE             TAZORAC 0.1 % cream                     Medications marked \"taking\" at this time  Outpatient Medications Marked as Taking for the 12/23/20 encounter (Office Visit) with Jamison Richards MD   Medication Sig Dispense Refill    ALPRAZolam (XANAX) 0.5 MG tablet Take 1 tablet by mouth nightly as needed for Sleep for up to 30 days.  30 tablet 0    fluticasone (FLONASE) 50 MCG/ACT nasal spray 2 sprays by Nasal route daily 1 Bottle 0    sulfamethoxazole-trimethoprim (BACTRIM) 400-80 MG per tablet Take 1 tablet by mouth daily 30 tablet 0    SUMAtriptan (IMITREX) 100 MG tablet TAKE ONE TABLET BY MOUTH ONCE AT ONSET OF HEADACHE 9 tablet 2    DULoxetine (CYMBALTA) 60 MG extended release capsule TAKE ONE CAPSULE BY MOUTH DAILY 30 capsule 5    cetirizine-psuedoephedrine (ZYRTEC-D) 5-120 MG per extended release tablet TAKE ONE TABLET BY MOUTH TWICE A DAY 60 tablet 5    lamoTRIgine (LAMICTAL) 100 MG tablet Take 300 mg by mouth daily   0    Clindamycin-Benzoyl Per, Refr, 1.2-5 % GEL Apply 1 Tube topically daily 1 Tube 3    TAZORAC 0.1 % cream           Medications patient taking as of now reconciled against medications ordered at time of hospital discharge: Yes    Chief Complaint   Patient presents with    [facility]     was bit by cat 11/22/20, a stray, did get 5 rabies injections and tetanus, left pinky finger, stil a bit tender    Otalgia     right ear pain, still on Augmentin, went to  and given neomycin ear drops and medrol dose pack History of Present illness - Follow up of Hospital diagnosis(es): Patient had a feral cat bite and was seen in the emergency room at my advice and was given a tetanus shot and immunoglobulin and then vaccine for rabies and then patient subsequently followed up in health department to get the other shots on the days of 3 7 and 14 and patient doing better and currently having problems with sinusitis and serous otitis and patient is currently taking Augmentin given by me and then subsequently was seen by urgent care where she was given Medrol Dosepak and also eardrops and patient using them and still has some problems with her ear and advised patient to start on Flonase nasal spray 2 sprays daily and complete the steroids and antibiotics and call me back next week if no improvement will refer to ENT and patient verbalized understanding  Patient taking Xanax for anxiety and sleep and refills given  Review in 4 months    Inpatient course: Discharge summary reviewed- see chart. Interval history/Current status: Patient improved rather than sinus problems and the ear problem on the right side    A comprehensive review of systems was negative except for what was noted in the HPI. Vitals:    12/23/20 1414   BP: 100/64   Site: Left Upper Arm   Position: Sitting   Cuff Size: Large Adult   Pulse: 88   Resp: 16   Temp: 96.9 °F (36.1 °C)   TempSrc: Infrared   Weight: 210 lb (95.3 kg)   Height: 5' 3\" (1.6 m)     Body mass index is 37.2 kg/m².    Wt Readings from Last 3 Encounters:   12/23/20 210 lb (95.3 kg)   07/30/20 205 lb 6.4 oz (93.2 kg)   03/10/20 203 lb 6.4 oz (92.3 kg)     BP Readings from Last 3 Encounters:   12/23/20 100/64   07/30/20 116/86   03/10/20 134/88        Physical Exam:  HEENT: Right eardrum slightly erythematous and ear canal is slightly swollen but no significant erythema and left ear canal normal and there is some tenderness on the mastoid area on the right side  Heart S1-S2 are of normal density

## 2020-12-29 ENCOUNTER — TELEPHONE (OUTPATIENT)
Dept: INTERNAL MEDICINE CLINIC | Age: 40
End: 2020-12-29

## 2020-12-29 NOTE — TELEPHONE ENCOUNTER
Patient calling stating that her Ear is still in somewhat pain and fluid behind the ear asking for a referral to an ENT

## 2021-03-22 RX ORDER — DULOXETIN HYDROCHLORIDE 60 MG/1
CAPSULE, DELAYED RELEASE ORAL
Qty: 30 CAPSULE | Refills: 0 | Status: SHIPPED | OUTPATIENT
Start: 2021-03-22 | End: 2021-04-20

## 2021-03-22 NOTE — TELEPHONE ENCOUNTER
Cem Valenzuela is calling to request a refill on the following medication(s):    Medication Request:    Last filled 7/30/2020 #30 with 5 RF  Pended 30 days with note, pt is due for appt.     Requested Prescriptions     Pending Prescriptions Disp Refills    DULoxetine (CYMBALTA) 60 MG extended release capsule [Pharmacy Med Name: DULOXETINE HCL DR 60 MG CAPSULE] 30 capsule 4     Sig: TAKE ONE CAPSULE BY MOUTH DAILY       Last Visit Date (If Applicable):  10/21/1248    Next Visit Date:    none

## 2021-03-23 RX ORDER — CETIRIZINE HYDROCHLORIDE, PSEUDOEPHEDRINE HYDROCHLORIDE 5; 120 MG/1; MG/1
TABLET, FILM COATED, EXTENDED RELEASE ORAL
Qty: 60 TABLET | Refills: 4 | Status: SHIPPED | OUTPATIENT
Start: 2021-03-23 | End: 2021-12-22 | Stop reason: SDUPTHER

## 2021-04-20 RX ORDER — DULOXETIN HYDROCHLORIDE 60 MG/1
CAPSULE, DELAYED RELEASE ORAL
Qty: 30 CAPSULE | Refills: 2 | Status: SHIPPED | OUTPATIENT
Start: 2021-04-20 | End: 2021-07-19

## 2021-07-19 RX ORDER — DULOXETIN HYDROCHLORIDE 60 MG/1
CAPSULE, DELAYED RELEASE ORAL
Qty: 30 CAPSULE | Refills: 0 | Status: SHIPPED | OUTPATIENT
Start: 2021-07-19 | End: 2021-09-02 | Stop reason: SDUPTHER

## 2021-07-19 NOTE — TELEPHONE ENCOUNTER
Angie Bailey is calling to request a refill on the following medication(s):    Medication Request:  Requested Prescriptions     Pending Prescriptions Disp Refills    DULoxetine (CYMBALTA) 60 MG extended release capsule [Pharmacy Med Name: DULOXETINE HCL DR 60 MG CAPSULE] 30 capsule 1     Sig: TAKE ONE CAPSULE BY MOUTH DAILY     Lm for ramo, sent 30 no refill    Last Visit Date (If Applicable):  15/75/2127    Next Visit Date:    Visit date not found

## 2021-08-18 RX ORDER — DULOXETIN HYDROCHLORIDE 60 MG/1
CAPSULE, DELAYED RELEASE ORAL
Qty: 30 CAPSULE | Refills: 0 | OUTPATIENT
Start: 2021-08-18

## 2021-08-23 RX ORDER — DULOXETIN HYDROCHLORIDE 60 MG/1
CAPSULE, DELAYED RELEASE ORAL
Qty: 30 CAPSULE | Refills: 0 | OUTPATIENT
Start: 2021-08-23

## 2021-08-23 NOTE — TELEPHONE ENCOUNTER
Addy Parker is calling to request a refill on the following medication(s):    Medication Request:  Requested Prescriptions     Refused Prescriptions Disp Refills    DULoxetine (CYMBALTA) 60 MG extended release capsule [Pharmacy Med Name: DULOXETINE HCL DR 60 MG CAPSULE] 30 capsule 0     Sig: TAKE ONE CAPSULE BY MOUTH DAILY     Refused By: Cori Ba     Reason for Refusal: Patient needs an appointment       Last Visit Date (If Applicable):  49/21/9420    Next Visit Date:    Visit date not found

## 2021-09-02 ENCOUNTER — OFFICE VISIT (OUTPATIENT)
Dept: INTERNAL MEDICINE CLINIC | Age: 41
End: 2021-09-02
Payer: COMMERCIAL

## 2021-09-02 VITALS
BODY MASS INDEX: 36.86 KG/M2 | SYSTOLIC BLOOD PRESSURE: 106 MMHG | DIASTOLIC BLOOD PRESSURE: 70 MMHG | WEIGHT: 208 LBS | TEMPERATURE: 97.1 F | RESPIRATION RATE: 16 BRPM | HEART RATE: 100 BPM | HEIGHT: 63 IN

## 2021-09-02 DIAGNOSIS — G89.29 CHRONIC PAIN OF LEFT KNEE: Primary | ICD-10-CM

## 2021-09-02 DIAGNOSIS — F41.9 ANXIETY: ICD-10-CM

## 2021-09-02 DIAGNOSIS — M25.562 CHRONIC PAIN OF LEFT KNEE: Primary | ICD-10-CM

## 2021-09-02 DIAGNOSIS — E66.9 OBESITY (BMI 35.0-39.9 WITHOUT COMORBIDITY): ICD-10-CM

## 2021-09-02 DIAGNOSIS — G47.00 INSOMNIA, UNSPECIFIED TYPE: ICD-10-CM

## 2021-09-02 PROCEDURE — 99214 OFFICE O/P EST MOD 30 MIN: CPT | Performed by: INTERNAL MEDICINE

## 2021-09-02 RX ORDER — FLUTICASONE PROPIONATE 50 MCG
2 SPRAY, SUSPENSION (ML) NASAL DAILY
Qty: 16 G | Refills: 5 | Status: SHIPPED
Start: 2021-09-02 | End: 2022-06-29 | Stop reason: ALTCHOICE

## 2021-09-02 RX ORDER — SUMATRIPTAN 100 MG/1
TABLET, FILM COATED ORAL
Qty: 9 TABLET | Refills: 5 | Status: SHIPPED | OUTPATIENT
Start: 2021-09-02 | End: 2021-12-22 | Stop reason: SDUPTHER

## 2021-09-02 RX ORDER — DULOXETIN HYDROCHLORIDE 60 MG/1
CAPSULE, DELAYED RELEASE ORAL
Qty: 30 CAPSULE | Refills: 5 | Status: SHIPPED | OUTPATIENT
Start: 2021-09-02 | End: 2021-12-22 | Stop reason: SDUPTHER

## 2021-09-02 RX ORDER — ALPRAZOLAM 0.5 MG/1
0.5 TABLET ORAL NIGHTLY PRN
Qty: 30 TABLET | Refills: 0 | Status: SHIPPED | OUTPATIENT
Start: 2021-09-02 | End: 2021-12-22 | Stop reason: SDUPTHER

## 2021-09-02 NOTE — PROGRESS NOTES
(gastroesophageal reflux disease)     Hypercholesteremia     Hypertension     Migraine     Seasonal allergies     SVT [facility]supraventricular tachycardia) ([facility])        Past Surgical History:   Procedure Laterality Date    ASD REPAIR  2/16/00    CARDIAC SURGERY      CARPAL TUNNEL RELEASE Right     HERNIA REPAIR  05/17/2016    HYSTERECTOMY  05/17/2016       Family History   Problem Relation Age of Onset    Diabetes Other     Cancer Other     Asthma Other     Heart Disease Other     Migraines Other      ROS   Constitutional:  Negative for fatigue, loss of appetite and unexpected weight change   HEENT            : Negative for neck stiffness and pain, no congestion or sinus pressure   Eyes                : No visual disturbance or pain   Cardiovascular: No chest pain or palpitations or leg swelling   Respiratory      : Negative for cough, shortness of breath or wheezing   Gastrointestinal: Negative for abdominal pain, constipation or diarrhea and bloating No nausea or vomiting   Genitourinary:     No urgency or frequency, no burning or hematuria   Musculoskeletal: Positive for arthralgias, back pain or myalgias   Skin                  : Negative for rash or erythema   Neurological    : Negative for dizziness, weakness, tremors ,light headedness or syncope   Psychiatric       : Negative for dysphoric mood, sleep disturbances, nervous or anxious, or decreased concentration   All other review of systems was negative    Objective  Physical Examination:    Nursing note reviewed    /70 (Site: Left Upper Arm, Position: Sitting, Cuff Size: Large Adult)   Pulse 100   Temp 97.1 °F (36.2 °C) (Infrared)   Resp 16   Ht 5' 3\" (1.6 m)   Wt 208 lb (94.3 kg)   LMP 03/30/2015   BMI 36.85 kg/m²   BP Readings from Last 3 Encounters:   09/02/21 106/70   12/23/20 100/64   07/30/20 116/86         Constitutional:  Blake Braga is oriented to place, person and time ,appears well-developed and well-nourished  HEENT: Atraumatic and normocephalic, external ears normal bilaterally, nose normal no oropharyngeal exudate and is clear and moist  Eyes:  EOCM normal; conjunctivae normal; PERRLA bilaterally  Neck:  Normal range of motion, neck supple, no JVD and no thyromegaly  Cardiovascular:  RRR, normal heart sounds and intact distal pulses  Pulmonary:  effort normal and breath sounds normal bilaterally,no wheezes or rales, no respiratory distress  Abdominal:  Soft, non-tender; normal bowel sounds, no masses  Musculoskeletal:  Normal range of motion and no edema or tenderness bilaterally except left knee flexion is limited and tenderness present on the medial aspect  No lymphadenopathy  Neurological:  alert, oriented, and normal reflexes bilaterally  Skin: warm and dry  Psychiatric:  normal mood and effect; behavior normal.    Labs:   No results found for: LABA1C  Lab Results   Component Value Date    CHOL 194 12/11/2014     Lab Results   Component Value Date    HDL 43 12/11/2014     Lab Results   Component Value Date    LDLCALC 111 12/11/2014     Lab Results   Component Value Date    TRIG 198 12/11/2014     No components found for: [place], [place]  Lab Results   Component Value Date    WBC 8.8 12/01/2016    HGB 13.9 12/01/2016    HCT 41.8 12/01/2016    MCV 87.4 12/01/2016     12/01/2016     No results found for: INR, PROTIME  Lab Results   Component Value Date    GLUCOSE 104 (H) 12/01/2016    CREATININE 0.54 12/01/2016    BUN 13 12/01/2016     12/01/2016    K 3.7 12/01/2016    CL 99 12/01/2016    CO2 28 12/01/2016     Lab Results   Component Value Date    ALT 20 12/01/2016    AST 17 12/01/2016    ALKPHOS 84 12/01/2016    BILITOT 0.29 (L) 12/01/2016     Lab Results   Component Value Date    LABALBU 4.2 12/01/2016     No results found for: TSH, CBC  Assessment:  1. Chronic pain of left knee    2. Insomnia, unspecified type    3. Obesity (BMI 35.0-39.9 without comorbidity)    4.  Anxiety        Plan:  Patient has significant limitation of movements on the left knee and tenderness along the medial aspect of the left knee and flexion is very limited and advised patient to follow-up with orthopedics and trying to make an appointment sooner   Patient taking Xanax for anxiety and sleep and take only as needed and refills given and last refill was given in December of last year for 30 tablets   Discussed about weight gain and patient wants to try the weight loss medications and patient has tried Adipex in the past which had given her bad side effects and does not want to try it but she will check with her insurance about [address] and will call me back   Review in 4 months            1. Daniella Dietz received counseling on the following healthy behaviors: nutrition and exercise    2. Prior labs and health maintenance reviewed. 3.  Discussed use, benefit, and side effects of prescribed medications. Barriers to medication compliance addressed. All her questions were answered. Pt voiced understanding. Daniella Dietz will continue current medications, diet and exercise. Orders Placed This Encounter   Medications    DULoxetine (CYMBALTA) 60 MG extended release capsule     Sig: ONE DAILY     Dispense:  30 capsule     Refill:  5    fluticasone (FLONASE) 50 MCG/ACT nasal spray     Si sprays by Nasal route daily     Dispense:  16 g     Refill:  5    SUMAtriptan (IMITREX) 100 MG tablet     Sig: TAKE ONE TABLET BY MOUTH ONCE AT ONSET OF HEADACHE     Dispense:  9 tablet     Refill:  5    ALPRAZolam (XANAX) 0.5 MG tablet     Sig: Take 1 tablet by mouth nightly as needed for Sleep for up to 30 days.      Dispense:  30 tablet     Refill:  0          Completed Refills               Requested Prescriptions     Signed Prescriptions Disp Refills    DULoxetine (CYMBALTA) 60 MG extended release capsule 30 capsule 5     Sig: ONE DAILY    fluticasone (FLONASE) 50 MCG/ACT nasal spray 16 g 5     Si sprays by Nasal route daily    SUMAtriptan (IMITREX) 100 MG tablet 9 tablet 5     Sig: TAKE ONE TABLET BY MOUTH ONCE AT ONSET OF HEADACHE    ALPRAZolam (XANAX) 0.5 MG tablet 30 tablet 0     Sig: Take 1 tablet by mouth nightly as needed for Sleep for up to 30 days. 4. Patient given educational materials - see patient instructions    5. Was a self-tracking handout given in paper form or via [employer]? NO    No orders of the defined types were placed in this encounter. Return in about 4 months (around 1/2/2022). Patient voiced understanding and agreed to treatment plan. Electronically signed by Valencia Cortez MD on 9/2/2021 at 4:24 PM    This note is created with a voice recognition program and while intend to generate a document that accurately reflects the content of the visit, no guarantee can be provided that every mistake has been identified and corrected by editing.

## 2021-12-16 ENCOUNTER — TELEPHONE (OUTPATIENT)
Dept: INTERNAL MEDICINE CLINIC | Age: 41
End: 2021-12-16

## 2021-12-16 RX ORDER — PREDNISONE 10 MG/1
10 TABLET ORAL
Qty: 15 TABLET | Refills: 0 | Status: SHIPPED | OUTPATIENT
Start: 2021-12-16 | End: 2021-12-21

## 2021-12-16 RX ORDER — TIZANIDINE 4 MG/1
4 TABLET ORAL 2 TIMES DAILY
Qty: 20 TABLET | Refills: 0 | Status: SHIPPED | OUTPATIENT
Start: 2021-12-16 | End: 2021-12-26

## 2021-12-16 NOTE — TELEPHONE ENCOUNTER
Zanaflex 4 mg twice daily #20  Prednisone 10 mg 3 times daily for 5 days  See me tomorrow in the office

## 2021-12-16 NOTE — TELEPHONE ENCOUNTER
Patient calling with pain in neck for about 3 wks   states she pulled a muscle in her neck now she can't move her neck from left to right without being in excruciating pain

## 2021-12-22 ENCOUNTER — OFFICE VISIT (OUTPATIENT)
Dept: INTERNAL MEDICINE CLINIC | Age: 41
End: 2021-12-22
Payer: COMMERCIAL

## 2021-12-22 VITALS
SYSTOLIC BLOOD PRESSURE: 112 MMHG | HEIGHT: 63 IN | HEART RATE: 91 BPM | TEMPERATURE: 97 F | WEIGHT: 201 LBS | DIASTOLIC BLOOD PRESSURE: 82 MMHG | BODY MASS INDEX: 35.61 KG/M2 | OXYGEN SATURATION: 99 % | RESPIRATION RATE: 24 BRPM

## 2021-12-22 DIAGNOSIS — Z71.3 DIETARY COUNSELING: ICD-10-CM

## 2021-12-22 DIAGNOSIS — R59.0 CERVICAL LYMPHADENOPATHY: ICD-10-CM

## 2021-12-22 DIAGNOSIS — G47.00 INSOMNIA, UNSPECIFIED TYPE: ICD-10-CM

## 2021-12-22 DIAGNOSIS — M54.2 NECK PAIN: Primary | ICD-10-CM

## 2021-12-22 PROCEDURE — 99214 OFFICE O/P EST MOD 30 MIN: CPT | Performed by: INTERNAL MEDICINE

## 2021-12-22 PROCEDURE — G0447 BEHAVIOR COUNSEL OBESITY 15M: HCPCS | Performed by: INTERNAL MEDICINE

## 2021-12-22 PROCEDURE — G0444 DEPRESSION SCREEN ANNUAL: HCPCS | Performed by: INTERNAL MEDICINE

## 2021-12-22 PROCEDURE — G8417 CALC BMI ABV UP PARAM F/U: HCPCS | Performed by: INTERNAL MEDICINE

## 2021-12-22 RX ORDER — CEPHALEXIN 500 MG/1
500 CAPSULE ORAL 3 TIMES DAILY
Qty: 21 CAPSULE | Refills: 0 | Status: SHIPPED | OUTPATIENT
Start: 2021-12-22 | End: 2021-12-29

## 2021-12-22 RX ORDER — ALPRAZOLAM 0.5 MG/1
0.5 TABLET ORAL NIGHTLY PRN
Qty: 30 TABLET | Refills: 0 | Status: SHIPPED | OUTPATIENT
Start: 2021-12-22 | End: 2022-06-29 | Stop reason: SDUPTHER

## 2021-12-22 RX ORDER — CETIRIZINE HYDROCHLORIDE, PSEUDOEPHEDRINE HYDROCHLORIDE 5; 120 MG/1; MG/1
TABLET, FILM COATED, EXTENDED RELEASE ORAL
Qty: 60 TABLET | Refills: 4 | Status: SHIPPED
Start: 2021-12-22 | End: 2022-06-29 | Stop reason: ALTCHOICE

## 2021-12-22 RX ORDER — DULOXETIN HYDROCHLORIDE 60 MG/1
CAPSULE, DELAYED RELEASE ORAL
Qty: 30 CAPSULE | Refills: 5 | Status: SHIPPED | OUTPATIENT
Start: 2021-12-22 | End: 2022-08-31

## 2021-12-22 RX ORDER — SUMATRIPTAN 100 MG/1
TABLET, FILM COATED ORAL
Qty: 9 TABLET | Refills: 5 | Status: SHIPPED | OUTPATIENT
Start: 2021-12-22 | End: 2022-06-29 | Stop reason: SDUPTHER

## 2021-12-22 SDOH — ECONOMIC STABILITY: FOOD INSECURITY: WITHIN THE PAST 12 MONTHS, THE FOOD YOU BOUGHT JUST DIDN'T LAST AND YOU DIDN'T HAVE MONEY TO GET MORE.: NEVER TRUE

## 2021-12-22 SDOH — ECONOMIC STABILITY: FOOD INSECURITY: WITHIN THE PAST 12 MONTHS, YOU WORRIED THAT YOUR FOOD WOULD RUN OUT BEFORE YOU GOT MONEY TO BUY MORE.: NEVER TRUE

## 2021-12-22 ASSESSMENT — PATIENT HEALTH QUESTIONNAIRE - PHQ9
SUM OF ALL RESPONSES TO PHQ9 QUESTIONS 1 & 2: 0
SUM OF ALL RESPONSES TO PHQ QUESTIONS 1-9: 0
SUM OF ALL RESPONSES TO PHQ QUESTIONS 1-9: 0
2. FEELING DOWN, DEPRESSED OR HOPELESS: 0
SUM OF ALL RESPONSES TO PHQ QUESTIONS 1-9: 0
1. LITTLE INTEREST OR PLEASURE IN DOING THINGS: 0

## 2021-12-22 ASSESSMENT — SOCIAL DETERMINANTS OF HEALTH (SDOH): HOW HARD IS IT FOR YOU TO PAY FOR THE VERY BASICS LIKE FOOD, HOUSING, MEDICAL CARE, AND HEATING?: NOT HARD AT ALL

## 2021-12-22 NOTE — PROGRESS NOTES
Gabriella Montes is a 35 y.o. female who presents for   Chief Complaint   Patient presents with    Follow-up     patient states she is still having some neck at the base of her neck and the back of head, states the [facility] has helped;    [address] [facility] Maintenance     hep c, hiv, d screen, potass, creat, lipid, flu, covid    and follow up of chronic medical problems. Patient Active Problem List   Diagnosis    Migraine    Acne    Hypercholesteremia    Atrial septal defect    [facility]) ([facility]    Depression    Anxiety    Seasonal allergies    Chronic shoulder pain    Sprain, knee    Cervical strain    Chronic rhinitis     HPI  Here for follow-up on insomnia and anxiety and patient also complains of neck pain is slightly better compared to last week when she started the steroids    Current Outpatient Medications   Medication Sig Dispense Refill    DULoxetine (CYMBALTA) 60 MG extended release capsule ONE DAILY 30 capsule 5    SUMAtriptan (IMITREX) 100 MG tablet TAKE ONE TABLET BY MOUTH ONCE AT ONSET OF HEADACHE 9 tablet 5    ALPRAZolam (XANAX) 0.5 MG tablet Take 1 tablet by mouth nightly as needed for Sleep for up to 30 days.  30 tablet 0    cetirizine-psuedoephedrine (ZYRTEC-D) 5-120 MG per extended release tablet TAKE ONE TABLET BY MOUTH TWICE A DAY 60 tablet 4    cephALEXin (KEFLEX) 500 MG capsule Take 1 capsule by mouth 3 times daily for 7 days 21 capsule 0    tiZANidine (ZANAFLEX) 4 MG tablet Take 1 tablet by mouth 2 times daily for 10 days 20 tablet 0    Bacillus Coagulans-Inulin (ALIGN PREBIOTIC-PROBIOTIC PO) Take by mouth      fluticasone (FLONASE) 50 MCG/ACT nasal spray 2 sprays by Nasal route daily 16 g 5    spironolactone (ALDACTONE) 50 MG tablet Take 1 tablet by mouth 2 times daily TAKE ONE TABLET BY MOUTH TWICE A DAY 60 tablet 0    lamoTRIgine (LAMICTAL) 100 MG tablet Take 300 mg by mouth daily   0    Clindamycin-Benzoyl Per, Refr, 1.2-5 % GEL Apply 1 Tube topically daily 1 Tube 3    TAZORAC 0.1 % cream        No current facility-administered medications for this visit.        No Known Allergies    Past Medical History:   Diagnosis Date    Acne     Anxiety     Atrial septal defect     Depression     GERD (gastroesophageal reflux disease)     Hypercholesteremia     Hypertension     Migraine     Seasonal allergies     SVT [facility]supra[facility]ricular tachycardia) ([facility])        Past Surgical History:   Procedure Laterality Date    ASD REPAIR  2/16/00    CARDIAC SURGERY      CARPAL TUNNEL RELEASE Right     HERNIA REPAIR  05/17/2016    HYSTERECTOMY  05/17/2016       Family History   Problem Relation Age of Onset    Diabetes Other     Cancer Other     Asthma Other     Heart Disease Other     Migraines Other      ROS   Constitutional:  Negative for fatigue, loss of appetite and unexpected weight change   HEENT            : Negative for neck stiffness and pain, no congestion or sinus pressure   Eyes                : No visual disturbance or pain   Cardiovascular: No chest pain or palpitations or leg swelling   Respiratory      : Negative for cough, shortness of breath or wheezing   Gastrointestinal: Negative for abdominal pain, constipation or diarrhea and bloating No nausea or vomiting   Genitourinary:     No urgency or frequency, no burning or hematuria   Musculoskeletal: No arthralgias, back pain or myalgias   Skin                  : Negative for rash or erythema   Neurological    : Negative for dizziness, weakness, tremors ,light headedness or syncope   Psychiatric       : Negative for dysphoric mood, sleep disturbances, nervous or anxious, or decreased concentration   All other review of systems was negative    Objective  Physical Examination:    Nursing note reviewed    /82 (Site: Right Upper Arm, Position: Sitting, Cuff Size: Large Adult)   Pulse 91   Temp 97 °F (36.1 °C) (Temporal)   Resp 24   Ht 5' 2.99\" (1.6 m)   Wt 201 lb (91.2 kg)   LMP 03/30/2015   SpO2 99%   Breastfeeding No   BMI 35.61 kg/m²   BP Readings from Last 3 Encounters:   12/22/21 112/82   09/02/21 106/70   12/23/20 100/64         Constitutional:  Olimpia Thomas is oriented to place, person and time ,appears well-developed and well-nourished  HEENT:  Atraumatic and normocephalic, external ears normal bilaterally, nose normal no oropharyngeal exudate and is clear and moist  Eyes:  EOCM normal; conjunctivae normal; PERRLA bilaterally  Neck:  Normal range of motion, neck supple, no JVD and no thyromegaly  Cardiovascular:  RRR, normal heart sounds and intact distal pulses  Pulmonary:  effort normal and breath sounds normal bilaterally,no wheezes or rales, no respiratory distress  Abdominal:  Soft, non-tender; normal bowel sounds, no masses  Musculoskeletal:  Normal range of motion and no edema or tenderness bilaterally  No lymphadenopathy except small tender posterior cervical lymph nodes mostly on the left side  Neurological:  alert, oriented, and normal reflexes bilaterally  Skin: warm and dry  Psychiatric:  normal mood and effect; behavior normal.    Labs:   No results found for: LABA1C  Lab Results   Component Value Date    CHOL 194 12/11/2014     Lab Results   Component Value Date    HDL 43 12/11/2014     Lab Results   Component Value Date    LDLCALC 111 12/11/2014     Lab Results   Component Value Date    TRIG 198 12/11/2014     No components found for: [place], [place]  Lab Results   Component Value Date    WBC 8.8 12/01/2016    HGB 13.9 12/01/2016    HCT 41.8 12/01/2016    MCV 87.4 12/01/2016     12/01/2016     No results found for: INR, PROTIME  Lab Results   Component Value Date    GLUCOSE 104 (H) 12/01/2016    CREATININE 0.54 12/01/2016    BUN 13 12/01/2016     12/01/2016    K 3.7 12/01/2016    CL 99 12/01/2016    CO2 28 12/01/2016     Lab Results   Component Value Date    ALT 20 12/01/2016    AST 17 12/01/2016    ALKPHOS 84 12/01/2016    BILITOT 0.29 (L) 12/01/2016     Lab Results   Component Value Date    TK 4.2 12/01/2016     No results found for: TSH, CBC  Assessment:  1. Neck pain    2. Insomnia, unspecified type    3. Cervical lymphadenopathy        Plan:  Patient has some tenderness and slightly enlarged lymph nodes on the posterior aspect of the cervical chain and patient's movements were normal and advised patient to continue and complete steroids as prescribed and also started on Keflex 500 mg 3 times daily for 7 days and advised patient to call me back next week if any issues  Patient is on Xanax for anxiety and refills given  Counseled about diet exercise and weight loss and patient lost about 8 pounds in the last visit  Review in 4 months           1. Steff High received counseling on the following healthy behaviors: nutrition and exercise    2. Prior labs and health maintenance reviewed. 3.  Discussed use, benefit, and side effects of prescribed medications. Barriers to medication compliance addressed. All her questions were answered. Pt voiced understanding. Steff High will continue current medications, diet and exercise. Orders Placed This Encounter   Medications    DULoxetine (CYMBALTA) 60 MG extended release capsule     Sig: ONE DAILY     Dispense:  30 capsule     Refill:  5    SUMAtriptan (IMITREX) 100 MG tablet     Sig: TAKE ONE TABLET BY MOUTH ONCE AT ONSET OF HEADACHE     Dispense:  9 tablet     Refill:  5    ALPRAZolam (XANAX) 0.5 MG tablet     Sig: Take 1 tablet by mouth nightly as needed for Sleep for up to 30 days.      Dispense:  30 tablet     Refill:  0    cetirizine-psuedoephedrine (ZYRTEC-D) 5-120 MG per extended release tablet     Sig: TAKE ONE TABLET BY MOUTH TWICE A DAY     Dispense:  60 tablet     Refill:  4    cephALEXin (KEFLEX) 500 MG capsule     Sig: Take 1 capsule by mouth 3 times daily for 7 days     Dispense:  21 capsule     Refill:  0          Completed Refills               Requested Prescriptions     Signed Prescriptions Disp Refills    DULoxetine (CYMBALTA) 60 MG extended release capsule 30 capsule 5     Sig: ONE DAILY    SUMAtriptan (IMITREX) 100 MG tablet 9 tablet 5     Sig: TAKE ONE TABLET BY MOUTH ONCE AT ONSET OF HEADACHE    ALPRAZolam (XANAX) 0.5 MG tablet 30 tablet 0     Sig: Take 1 tablet by mouth nightly as needed for Sleep for up to 30 days.  cetirizine-psuedoephedrine (ZYRTEC-D) 5-120 MG per extended release tablet 60 tablet 4     Sig: TAKE ONE TABLET BY MOUTH TWICE A DAY    cephALEXin (KEFLEX) 500 MG capsule 21 capsule 0     Sig: Take 1 capsule by mouth 3 times daily for 7 days     4. Patient given educational materials - see patient instructions    5. Was a self-tracking handout given in paper form or via [employer]? NO    No orders of the defined types were placed in this encounter. No follow-ups on file. Patient voiced understanding and agreed to treatment plan. Electronically signed by Noam Jang MD on 12/22/2021 at 4:12 PM    This note is created with a voice recognition program and while intend to generate a document that accurately reflects the content of the visit, no guarantee can be provided that every mistake has been identified and corrected by editing. BMI was elevated today, and weight loss plan recommended is : daily exercise regimen.

## 2021-12-28 NOTE — PATIENT INSTRUCTIONS

## 2022-01-05 ENCOUNTER — TELEPHONE (OUTPATIENT)
Dept: INTERNAL MEDICINE CLINIC | Age: 42
End: 2022-01-05

## 2022-01-05 DIAGNOSIS — M54.2 NECK PAIN: Primary | ICD-10-CM

## 2022-01-05 DIAGNOSIS — R51.9 ACUTE INTRACTABLE HEADACHE, UNSPECIFIED HEADACHE TYPE: ICD-10-CM

## 2022-01-05 NOTE — TELEPHONE ENCOUNTER
----- Message from Suzanna Echevarria sent at 1/5/2022 11:28 AM EST -----  Subject: Message to Provider    QUESTIONS  Information for Provider? [facility] received a call from Pt Jane Ahn)? Pt   completed the antibiotics that PCP Mckenzie prescribed on 12/22/21 for   her head pain and possible swollen lymph nodes during their appt at   3:30pm. Pt's head pain has come back after finishing the steroids she was   also prescribed earlier by PCP but nothing else helped the pain. Pt does   not think she has anything swollen currently. Pt wants to know what PCP   suggests to do next. Pt says the pain is not one to be tolerated; it is   painful.   ---------------------------------------------------------------------------  --------------  CALL BACK INFO  What is the best way for the office to contact you? OK to leave message on   voicemail  Preferred Call Back Phone Number? [phone]  ---------------------------------------------------------------------------  --------------  SCRIPT ANSWERS  Relationship to Patient? Self  Would you describe this as the worst headache of your life? No  Are you having fevers (100.4), chills or sweats? No  Are you having weakness on one side of your body, drooping on one side of   your face or difficult speaking? No  Have you had any injuries to your head? No  Have you recently (14 days) seen a provider for this issue?  Yes

## 2022-01-05 NOTE — TELEPHONE ENCOUNTER
Patient was complaining of neck pain more than the headache at that time  Start on Zanaflex 4 mg twice daily #20 if patient can tolerate  Also order an x-ray of the cervical spine and CT of the head to evaluate for headache and neck pain

## 2022-01-06 RX ORDER — TIZANIDINE 4 MG/1
4 TABLET ORAL EVERY 12 HOURS PRN
Qty: 20 TABLET | Refills: 0 | Status: SHIPPED | OUTPATIENT
Start: 2022-01-06

## 2022-01-06 NOTE — TELEPHONE ENCOUNTER
LM for pt to call office. RX sent. Orders placed and faxed to [facility] scheduling, they will call pt to schedule CT, x-ray is walk in.

## 2022-01-07 ENCOUNTER — IMMUNIZATION (OUTPATIENT)
Dept: FAMILY MEDICINE CLINIC | Age: 42
End: 2022-01-07
Payer: COMMERCIAL

## 2022-01-07 PROCEDURE — 91300 COVID-19, PFIZER VACCINE 30MCG/0.3ML DOSE: CPT | Performed by: INTERNAL MEDICINE

## 2022-01-07 PROCEDURE — 0004A COVID-19, PFIZER VACCINE 30MCG/0.3ML DOSE: CPT | Performed by: INTERNAL MEDICINE

## 2022-06-24 ENCOUNTER — TELEPHONE (OUTPATIENT)
Dept: INTERNAL MEDICINE CLINIC | Age: 42
End: 2022-06-24

## 2022-06-24 NOTE — TELEPHONE ENCOUNTER
Pt saw ENT last year. ENT changed meds: zyrtec D was changed to xyzal and Flonase was changed to Nasacort. Pt was to have allergy testing and f/u, she has not had testing due to Covid. Pt is asking for  to take over xyzal and Nasacort.       [phone] - [phone] [id number] [address]  [address] [facility]

## 2022-06-29 ENCOUNTER — OFFICE VISIT (OUTPATIENT)
Dept: INTERNAL MEDICINE CLINIC | Age: 42
End: 2022-06-29
Payer: COMMERCIAL

## 2022-06-29 VITALS
OXYGEN SATURATION: 96 % | HEIGHT: 63 IN | SYSTOLIC BLOOD PRESSURE: 132 MMHG | RESPIRATION RATE: 18 BRPM | DIASTOLIC BLOOD PRESSURE: 68 MMHG | HEART RATE: 101 BPM | WEIGHT: 200.6 LBS | TEMPERATURE: 97.6 F | BODY MASS INDEX: 35.54 KG/M2

## 2022-06-29 DIAGNOSIS — F41.9 ANXIETY: ICD-10-CM

## 2022-06-29 DIAGNOSIS — F32.A DEPRESSION, UNSPECIFIED DEPRESSION TYPE: ICD-10-CM

## 2022-06-29 DIAGNOSIS — G47.00 INSOMNIA, UNSPECIFIED TYPE: ICD-10-CM

## 2022-06-29 DIAGNOSIS — E66.9 OBESITY (BMI 35.0-39.9 WITHOUT COMORBIDITY): Primary | ICD-10-CM

## 2022-06-29 DIAGNOSIS — Z71.3 DIETARY COUNSELING: ICD-10-CM

## 2022-06-29 DIAGNOSIS — Z91.09 ENVIRONMENTAL ALLERGIES: ICD-10-CM

## 2022-06-29 PROCEDURE — G8417 CALC BMI ABV UP PARAM F/U: HCPCS | Performed by: INTERNAL MEDICINE

## 2022-06-29 PROCEDURE — G0444 DEPRESSION SCREEN ANNUAL: HCPCS | Performed by: INTERNAL MEDICINE

## 2022-06-29 PROCEDURE — 99214 OFFICE O/P EST MOD 30 MIN: CPT | Performed by: INTERNAL MEDICINE

## 2022-06-29 RX ORDER — TRIAMCINOLONE ACETONIDE 55 UG/1
2 SPRAY, METERED NASAL DAILY
Qty: 3 EACH | Refills: 0 | Status: SHIPPED | OUTPATIENT
Start: 2022-06-29

## 2022-06-29 RX ORDER — SUMATRIPTAN 100 MG/1
TABLET, FILM COATED ORAL
Qty: 9 TABLET | Refills: 5 | Status: SHIPPED | OUTPATIENT
Start: 2022-06-29

## 2022-06-29 RX ORDER — LEVOCETIRIZINE DIHYDROCHLORIDE 5 MG/1
5 TABLET, FILM COATED ORAL NIGHTLY
Qty: 90 TABLET | Refills: 0 | Status: SHIPPED | OUTPATIENT
Start: 2022-06-29 | End: 2022-09-26

## 2022-06-29 RX ORDER — ALPRAZOLAM 0.5 MG/1
0.5 TABLET ORAL NIGHTLY PRN
Qty: 30 TABLET | Refills: 0 | Status: SHIPPED | OUTPATIENT
Start: 2022-06-29 | End: 2022-07-29

## 2022-06-29 ASSESSMENT — PATIENT HEALTH QUESTIONNAIRE - PHQ9
6. FEELING BAD ABOUT YOURSELF - OR THAT YOU ARE A FAILURE OR HAVE LET YOURSELF OR YOUR FAMILY DOWN: 0
SUM OF ALL RESPONSES TO PHQ QUESTIONS 1-9: 0
SUM OF ALL RESPONSES TO PHQ QUESTIONS 1-9: 0
2. FEELING DOWN, DEPRESSED OR HOPELESS: 0
7. TROUBLE CONCENTRATING ON THINGS, SUCH AS READING THE NEWSPAPER OR WATCHING TELEVISION: 0
8. MOVING OR SPEAKING SO SLOWLY THAT OTHER PEOPLE COULD HAVE NOTICED. OR THE OPPOSITE, BEING SO FIGETY OR RESTLESS THAT YOU HAVE BEEN MOVING AROUND A LOT MORE THAN USUAL: 0
9. THOUGHTS THAT YOU WOULD BE BETTER OFF DEAD, OR OF HURTING YOURSELF: 0
SUM OF ALL RESPONSES TO PHQ QUESTIONS 1-9: 0
10. IF YOU CHECKED OFF ANY PROBLEMS, HOW DIFFICULT HAVE THESE PROBLEMS MADE IT FOR YOU TO DO YOUR WORK, TAKE CARE OF THINGS AT HOME, OR GET ALONG WITH OTHER PEOPLE: 0
4. FEELING TIRED OR HAVING LITTLE ENERGY: 0
1. LITTLE INTEREST OR PLEASURE IN DOING THINGS: 0
SUM OF ALL RESPONSES TO PHQ9 QUESTIONS 1 & 2: 0
3. TROUBLE FALLING OR STAYING ASLEEP: 0
5. POOR APPETITE OR OVEREATING: 0
SUM OF ALL RESPONSES TO PHQ QUESTIONS 1-9: 0

## 2022-06-29 NOTE — PROGRESS NOTES
Domingo Cheema is a 38 y.o. female who presents for   Chief Complaint   Patient presents with    Hyperlipidemia     follow up.  Discuss Medications     pt would like to change zyrtec to xyazal and flonase to nasacort.  Weight Loss     pt would like to start on the saxenda.  Medication Refill    Health Maintenance     hiv, hep c, dm, lip     and follow up of chronic medical problems. Patient Active Problem List   Diagnosis    Migraine    Acne    Hypercholesteremia    Atrial septal defect    SVT (supraventricular tachycardia) (HCC)    Depression    Anxiety    Seasonal allergies    Chronic shoulder pain    Sprain, knee    Cervical strain    Chronic rhinitis     HPI  Here for follow-up on allergies and anxiety and patient also wants to discuss about weight loss medications    Current Outpatient Medications   Medication Sig Dispense Refill    SUMAtriptan (IMITREX) 100 MG tablet TAKE ONE TABLET BY MOUTH ONCE AT ONSET OF HEADACHE 9 tablet 5    ALPRAZolam (XANAX) 0.5 MG tablet Take 1 tablet by mouth nightly as needed for Sleep for up to 30 days.  30 tablet 0    Semaglutide-Weight Management (WEGOVY) 0.5 MG/0.5ML SOAJ SC injection Inject 0.5 mg into the skin every 7 days 2 mL 0    triamcinolone (NASACORT ALLERGY 24HR) 55 MCG/ACT nasal inhaler 2 sprays by Each Nostril route daily 3 each 0    levocetirizine (XYZAL ALLERGY 24HR) 5 MG tablet Take 1 tablet by mouth nightly 90 tablet 0    DULoxetine (CYMBALTA) 60 MG extended release capsule ONE DAILY 30 capsule 5    Bacillus Coagulans-Inulin (ALIGN PREBIOTIC-PROBIOTIC PO) Take by mouth      spironolactone (ALDACTONE) 50 MG tablet Take 1 tablet by mouth 2 times daily TAKE ONE TABLET BY MOUTH TWICE A DAY 60 tablet 0    lamoTRIgine (LAMICTAL) 100 MG tablet Take 300 mg by mouth daily   0    Clindamycin-Benzoyl Per, Refr, 1.2-5 % GEL Apply 1 Tube topically daily 1 Tube 3    TAZORAC 0.1 % cream       tiZANidine (ZANAFLEX) 4 MG tablet Take 1 tablet by mouth every 12 hours as needed (pain) (Patient not taking: Reported on 6/29/2022) 20 tablet 0     No current facility-administered medications for this visit.        No Known Allergies    Past Medical History:   Diagnosis Date    Acne     Anxiety     Atrial septal defect     Depression     GERD (gastroesophageal reflux disease)     Hypercholesteremia     Hypertension     Migraine     Seasonal allergies     SVT (supraventricular tachycardia) (HCC)        Past Surgical History:   Procedure Laterality Date    ASD REPAIR  2/16/00    CARDIAC SURGERY      CARPAL TUNNEL RELEASE Right     HERNIA REPAIR  05/17/2016    HYSTERECTOMY (CERVIX STATUS UNKNOWN)  05/17/2016       Family History   Problem Relation Age of Onset    Diabetes Other     Cancer Other     Asthma Other     Heart Disease Other     Migraines Other      ROS   Constitutional:  Negative for fatigue, loss of appetite and unexpected weight change   HEENT            : Negative for neck stiffness and pain, no congestion or sinus pressure   Eyes                : No visual disturbance or pain   Cardiovascular: No chest pain or palpitations or leg swelling   Respiratory      : Negative for cough, shortness of breath or wheezing   Gastrointestinal: Negative for abdominal pain, constipation or diarrhea and bloating No nausea or vomiting   Genitourinary:     No urgency or frequency, no burning or hematuria   Musculoskeletal: No arthralgias, back pain or myalgias   Skin                  : Negative for rash or erythema   Neurological    : Negative for dizziness, weakness, tremors ,light headedness or syncope   Psychiatric       : Negative for dysphoric mood, sleep disturbances, nervous or anxious, or decreased concentration   All other review of systems was negative    Objective  Physical Examination:    Nursing note reviewed    /68 (Site: Left Upper Arm, Position: Sitting, Cuff Size: Large Adult)   Pulse (!) 101   Temp 97.6 °F (36.4 °C) (Temporal)   Resp 18   Ht 5' 2.99\" (1.6 m)   Wt 200 lb 9.6 oz (91 kg)   LMP 03/30/2015   SpO2 96%   BMI 35.54 kg/m²   BP Readings from Last 3 Encounters:   06/29/22 132/68   12/22/21 112/82   09/02/21 106/70         Constitutional:  Zina Vitale is oriented to place, person and time ,appears well-developed and well-nourished  HEENT:  Atraumatic and normocephalic, external ears normal bilaterally, nose normal no oropharyngeal exudate and is clear and moist  Eyes:  EOCM normal; conjunctivae normal; PERRLA bilaterally  Neck:  Normal range of motion, neck supple, no JVD and no thyromegaly  Cardiovascular:  RRR, normal heart sounds and intact distal pulses  Pulmonary:  effort normal and breath sounds normal bilaterally,no wheezes or rales, no respiratory distress  Abdominal:  Soft, non-tender; normal bowel sounds, no masses  Musculoskeletal:  Normal range of motion and no edema or tenderness bilaterally  No lymphadenopathy  Neurological:  alert, oriented, and normal reflexes bilaterally  Skin: warm and dry  Psychiatric:  normal mood and effect; behavior normal.    Labs:   No results found for: LABA1C  Lab Results   Component Value Date    CHOL 194 12/11/2014     Lab Results   Component Value Date    HDL 43 12/11/2014     Lab Results   Component Value Date    LDLCALC 111 12/11/2014     Lab Results   Component Value Date    TRIG 198 12/11/2014     No results found for: [place], [place]  Lab Results   Component Value Date    WBC 8.8 12/01/2016    HGB 13.9 12/01/2016    HCT 41.8 12/01/2016    MCV 87.4 12/01/2016     12/01/2016     No results found for: INR, PROTIME  Lab Results   Component Value Date    GLUCOSE 104 (H) 12/01/2016    CREATININE 0.54 12/01/2016    BUN 13 12/01/2016     12/01/2016    K 3.7 12/01/2016    CL 99 12/01/2016    CO2 28 12/01/2016     Lab Results   Component Value Date    ALT 20 12/01/2016    AST 17 12/01/2016    ALKPHOS 84 12/01/2016    BILITOT 0.29 (L) 12/01/2016     Lab Results levocetirizine (XYZAL ALLERGY 24HR) 5 MG tablet     Sig: Take 1 tablet by mouth nightly     Dispense:  90 tablet     Refill:  0          Completed Refills               Requested Prescriptions     Signed Prescriptions Disp Refills    SUMAtriptan (IMITREX) 100 MG tablet 9 tablet 5     Sig: TAKE ONE TABLET BY MOUTH ONCE AT ONSET OF HEADACHE    ALPRAZolam (XANAX) 0.5 MG tablet 30 tablet 0     Sig: Take 1 tablet by mouth nightly as needed for Sleep for up to 30 days.  Semaglutide-Weight Management (WEGOVY) 0.5 MG/0.5ML SOAJ SC injection 2 mL 0     Sig: Inject 0.5 mg into the skin every 7 days    triamcinolone (NASACORT ALLERGY 24HR) 55 MCG/ACT nasal inhaler 3 each 0     Si sprays by Each Nostril route daily    levocetirizine (XYZAL ALLERGY 24HR) 5 MG tablet 90 tablet 0     Sig: Take 1 tablet by mouth nightly     4. Patient given educational materials - see patient instructions    5. Was a self-tracking handout given in paper form or via Sendmeboxt? NO    No orders of the defined types were placed in this encounter. Return in about 4 weeks (around 2022). Patient voiced understanding and agreed to treatment plan. Electronically signed by Va Whittaker MD on 2022 at 4:06 PM    This note is created with a voice recognition program and while intend to generate a document that accurately reflects the content of the visit, no guarantee can be provided that every mistake has been identified and corrected by editing. BMI was elevated today, and weight loss plan recommended is : medically supervised diet with primary care physician.

## 2022-07-05 NOTE — PATIENT INSTRUCTIONS
Learning About Obesity  What is obesity? Obesity means having an unhealthy amount of body fat. This puts your health in danger. It can lead to other health problems, such as type 2 diabetes and highblood pressure. How do you know if your weight is in the obesity range? To know if your weight is in the obesity range, your doctor looks at your bodymass index (BMI) and waist size. BMI is a number that is calculated from your weight and your height. To figure out your BMI for yourself, you can use an online tool, such as http://www.[address]/ on [employer]. If your BMI is 30.0 or higher, it falls within the obesity range. Keep in mind that BMI and waist size are only guides. They are not tools to determine yourideal body weight. What causes obesity? When you take in more calories than you burn off, you gain weight. How you eat, how active you are, and other things affect how your body uses calories andwhether you gain weight. If you have family members who have too much body fat, you may have inherited a tendency to gain weight. And your family also helps form your eating andlifestyle habits, which can lead to obesity. Also, our busy lives make it harder to plan and cook healthy meals. For many of us, it's easier to reach for prepared foods, go out to eat, or go to the drive-through. But these foods are often high in saturated fat and calories. Portions are often too large. What can you do to reach a healthy weight? Focus on health, not diets. Diets are hard to stay on and don't work in the long run. It is very hard tostay with a diet that includes lots of big changes in your eating habits. Instead of a diet, focus on lifestyle changes that will improve your health and achieve the right balance of energy and calories. To lose weight, you need to burn more calories than you take in.  You can do it by eating healthy foods in plan. And think about who could make itharder for you, and how to handle them. Try tracking. People who keep track of what they eat, feel, and do are better at losingweight. Try writing down things like:   What and how much you eat.  How you feel before and after each meal.   Details about each meal (like eating out or at home, eating alone, or with friends or family).  What you do to be active. Look and plan. As you track, look for patterns that you may want to change. Take note of:   When you eat and whether you skip meals.  How often you eat out.  How many fruits and vegetables you eat.  When you eat beyond feeling full.  When and why you eat for reasons other than being hungry. When you stray from your plan, don't get upset. Figure out what made you slipup and how you can fix it. Can you take medicines or have surgery to lose weight? If you have a BMI in a certain range and have not been able to lose weight withdiet and exercise, medicine or surgery may be an option for you. If you have a BMI of at least 30.0 (or a BMI of at least 27.0 and another health problem related to your weight), ask your doctor about weight-loss medicines. They work by making you feel less hungry, making you feel full more quickly, or changing how you digest fat. Medicines are used along with dietchanges and more physical activity to help you make lasting changes. If you have a BMI of 40.0 or more (or a BMI of 35.0 or more and another health problem related to your weight), your doctor may talk with you about surgery. Weight-loss surgery has risks, and you will need to work with your doctor tocompare the risk of having obesity with the risks of surgery. With any option you choose, you will still need to eat a healthy diet and getregular exercise. Follow-up care is a key part of your treatment and safety. Be sure to make and go to all appointments, and call your doctor if you are having problems.  It's also a good idea to know your test results and keep alist of the medicines you take. Where can you learn more? Go to [email or web address] org and sign in to your [employer] account. Enter N111 in the [facility] box to learn more about \"Learning About Obesity. \"     If you do not have an account, please click on the \"Sign Up Now\" link. Current as of: December 27, 2021               Content Version: 13.3  © 2006-2022 Healthwise, Incorporated. Care instructions adapted under license by [facility] ([facility]). If you have questions about a medical condition or this instruction, always ask your healthcare professional. [address] any warranty or liability for your use of this information.

## 2022-08-30 ENCOUNTER — TELEPHONE (OUTPATIENT)
Dept: INTERNAL MEDICINE CLINIC | Age: 42
End: 2022-08-30

## 2022-08-30 NOTE — TELEPHONE ENCOUNTER
Patient called asking for update on wt loss medication. Pharmacy told her they contacted us. They have not called, looks like medication wegovy needs pa it is not covered. Do you want pa or change medication?   Please advise

## 2022-08-31 RX ORDER — DULOXETIN HYDROCHLORIDE 60 MG/1
CAPSULE, DELAYED RELEASE ORAL
Qty: 30 CAPSULE | Refills: 5 | Status: SHIPPED | OUTPATIENT
Start: 2022-08-31

## 2022-08-31 NOTE — TELEPHONE ENCOUNTER
Spoke with patient. She will make the phone calls to her insurance and pharmacy and let us know so we can prescribe the medication.     @ 11:20AM 8/31 Juvencio King

## 2022-09-26 RX ORDER — LEVOCETIRIZINE DIHYDROCHLORIDE 5 MG/1
TABLET, FILM COATED ORAL
Qty: 30 TABLET | Refills: 0 | Status: SHIPPED | OUTPATIENT
Start: 2022-09-26 | End: 2022-10-24

## 2022-10-03 ENCOUNTER — TELEPHONE (OUTPATIENT)
Dept: INTERNAL MEDICINE CLINIC | Age: 42
End: 2022-10-03

## 2022-10-03 DIAGNOSIS — Z12.31 OTHER SCREENING MAMMOGRAM: Primary | ICD-10-CM

## 2022-10-03 NOTE — TELEPHONE ENCOUNTER
Patient asking for a order for a mammogram order?     Will go to [facility]    Please advise

## 2022-10-24 RX ORDER — LEVOCETIRIZINE DIHYDROCHLORIDE 5 MG/1
TABLET, FILM COATED ORAL
Qty: 30 TABLET | Refills: 0 | Status: SHIPPED | OUTPATIENT
Start: 2022-10-24 | End: 2022-11-21

## 2022-11-01 ENCOUNTER — TELEPHONE (OUTPATIENT)
Dept: INTERNAL MEDICINE CLINIC | Age: 42
End: 2022-11-01

## 2022-11-01 NOTE — TELEPHONE ENCOUNTER
----- Message from Judge Ca sent at 11/1/2022  8:37 AM EDT -----  Subject: Message to Provider    QUESTIONS  Information for Provider? Pt is calling in stating she went to [facility]   urgent care on 10/25/2022 and again on 10/30/2022 for chest hurts when   coughing, ears and throat hurt, sinus drainage, x 9 days. Pt was tested   for covid and flu, strep all are negative. Pt was put on antibiotics both   times, steroids, Mucinex with not help. Pt wants to know what she can do. Pt would like a call back. ---------------------------------------------------------------------------  --------------  Everette WALDRON  [phone]; OK to leave message on voicemail  ---------------------------------------------------------------------------  --------------  SCRIPT ANSWERS  Relationship to Patient?  Self

## 2022-11-01 NOTE — TELEPHONE ENCOUNTER
Lm with patient   Patient was seen at [facility] [phone] with Stephany Siddiqui records requested

## 2022-11-21 RX ORDER — LEVOCETIRIZINE DIHYDROCHLORIDE 5 MG/1
TABLET, FILM COATED ORAL
Qty: 30 TABLET | Refills: 0 | Status: SHIPPED | OUTPATIENT
Start: 2022-11-21

## 2022-11-21 NOTE — TELEPHONE ENCOUNTER
Erica Maldonado is calling to request a refill on the following medication(s):    Last Visit Date (If Applicable):  9/91/3596    Next Visit Date:    Visit date not found    Medication Request:  Requested Prescriptions     Pending Prescriptions Disp Refills    levocetirizine (XYZAL) 5 MG tablet [Pharmacy Med Name: LEVOCETIRIZINE 5 MG TABLET] 30 tablet 0     Sig: TAKE ONE TABLET BY MOUTH ONCE NIGHTLY

## 2022-12-23 RX ORDER — LEVOCETIRIZINE DIHYDROCHLORIDE 5 MG/1
TABLET, FILM COATED ORAL
Qty: 30 TABLET | Refills: 0 | Status: SHIPPED | OUTPATIENT
Start: 2022-12-23

## 2022-12-23 NOTE — TELEPHONE ENCOUNTER
Ritesh Mendez is calling to request a refill on the following medication(s):    Medication Request:  Requested Prescriptions     Pending Prescriptions Disp Refills    levocetirizine (XYZAL) 5 MG tablet [Pharmacy Med Name: LEVOCETIRIZINE 5 MG TABLET] 30 tablet 0     Sig: TAKE ONE TABLET BY MOUTH ONCE NIGHTLY       Last Visit Date (If Applicable):  5/40/8727    Next Visit Date:    Visit date not found

## 2023-01-18 RX ORDER — LEVOCETIRIZINE DIHYDROCHLORIDE 5 MG/1
TABLET, FILM COATED ORAL
Qty: 30 TABLET | Refills: 0 | Status: SHIPPED | OUTPATIENT
Start: 2023-01-18

## 2023-01-18 NOTE — TELEPHONE ENCOUNTER
Laura Corral is calling to request a refill on the following medication(s):    Medication Request:  Requested Prescriptions     Pending Prescriptions Disp Refills    levocetirizine (XYZAL) 5 MG tablet [Pharmacy Med Name: LEVOCETIRIZINE 5 MG TABLET] 30 tablet 0     Sig: TAKE ONE TABLET BY MOUTH ONCE NIGHTLY       Last Visit Date (If Applicable):  6/83/4519    Next Visit Date:    Visit date not found

## 2023-02-20 RX ORDER — LEVOCETIRIZINE DIHYDROCHLORIDE 5 MG/1
TABLET, FILM COATED ORAL
Qty: 30 TABLET | Refills: 0 | Status: SHIPPED | OUTPATIENT
Start: 2023-02-20

## 2023-02-20 NOTE — TELEPHONE ENCOUNTER
Erica Maldonado is calling to request a refill on the following medication(s):    Medication Request:  Requested Prescriptions     Pending Prescriptions Disp Refills    levocetirizine (XYZAL) 5 MG tablet [Pharmacy Med Name: LEVOCETIRIZINE 5 MG TABLET] 30 tablet 0     Sig: TAKE ONE TABLET BY MOUTH ONCE NIGHTLY       Last Visit Date (If Applicable):  8/14/6246    Next Visit Date:    Visit date not found

## 2023-02-27 RX ORDER — DULOXETIN HYDROCHLORIDE 60 MG/1
CAPSULE, DELAYED RELEASE ORAL
Qty: 30 CAPSULE | Refills: 0 | Status: SHIPPED | OUTPATIENT
Start: 2023-02-27

## 2023-02-27 NOTE — TELEPHONE ENCOUNTER
Savanah Peace is calling to request a refill on the following medication(s):    Medication Request:  Requested Prescriptions     Pending Prescriptions Disp Refills    DULoxetine (CYMBALTA) 60 MG extended release capsule [Pharmacy Med Name: DULoxetine HCL DR 60 MG CAPSULE] 90 capsule 1     Sig: TAKE ONE CAPSULE BY MOUTH DAILY       Last Visit Date (If Applicable):  8/85/4338    Next Visit Date:    Visit date not found

## 2023-03-21 ENCOUNTER — OFFICE VISIT (OUTPATIENT)
Dept: INTERNAL MEDICINE CLINIC | Age: 43
End: 2023-03-21

## 2023-03-21 VITALS
WEIGHT: 203.2 LBS | RESPIRATION RATE: 24 BRPM | BODY MASS INDEX: 36 KG/M2 | DIASTOLIC BLOOD PRESSURE: 88 MMHG | OXYGEN SATURATION: 98 % | TEMPERATURE: 97 F | HEIGHT: 63 IN | SYSTOLIC BLOOD PRESSURE: 120 MMHG | HEART RATE: 97 BPM

## 2023-03-21 DIAGNOSIS — G47.00 INSOMNIA, UNSPECIFIED TYPE: ICD-10-CM

## 2023-03-21 DIAGNOSIS — F32.A DEPRESSION, UNSPECIFIED DEPRESSION TYPE: ICD-10-CM

## 2023-03-21 DIAGNOSIS — Z71.3 DIETARY COUNSELING: ICD-10-CM

## 2023-03-21 DIAGNOSIS — Z91.09 ENVIRONMENTAL ALLERGIES: ICD-10-CM

## 2023-03-21 DIAGNOSIS — R11.0 NAUSEA: ICD-10-CM

## 2023-03-21 DIAGNOSIS — E66.9 OBESITY (BMI 35.0-39.9 WITHOUT COMORBIDITY): ICD-10-CM

## 2023-03-21 DIAGNOSIS — F41.9 ANXIETY: Primary | ICD-10-CM

## 2023-03-21 RX ORDER — ONDANSETRON 4 MG/1
4 TABLET, FILM COATED ORAL DAILY PRN
Qty: 30 TABLET | Refills: 0 | Status: SHIPPED | OUTPATIENT
Start: 2023-03-21

## 2023-03-21 RX ORDER — PANTOPRAZOLE SODIUM 40 MG/1
40 TABLET, DELAYED RELEASE ORAL
Qty: 30 TABLET | Refills: 0 | Status: SHIPPED | OUTPATIENT
Start: 2023-03-21

## 2023-03-21 RX ORDER — ALPRAZOLAM 0.5 MG/1
0.5 TABLET ORAL NIGHTLY PRN
Qty: 30 TABLET | Refills: 0 | Status: SHIPPED | OUTPATIENT
Start: 2023-03-21 | End: 2023-04-20

## 2023-03-21 SDOH — ECONOMIC STABILITY: FOOD INSECURITY: WITHIN THE PAST 12 MONTHS, YOU WORRIED THAT YOUR FOOD WOULD RUN OUT BEFORE YOU GOT MONEY TO BUY MORE.: NEVER TRUE

## 2023-03-21 SDOH — ECONOMIC STABILITY: FOOD INSECURITY: WITHIN THE PAST 12 MONTHS, THE FOOD YOU BOUGHT JUST DIDN'T LAST AND YOU DIDN'T HAVE MONEY TO GET MORE.: NEVER TRUE

## 2023-03-21 SDOH — ECONOMIC STABILITY: INCOME INSECURITY: HOW HARD IS IT FOR YOU TO PAY FOR THE VERY BASICS LIKE FOOD, HOUSING, MEDICAL CARE, AND HEATING?: NOT HARD AT ALL

## 2023-03-21 SDOH — ECONOMIC STABILITY: HOUSING INSECURITY
IN THE LAST 12 MONTHS, WAS THERE A TIME WHEN YOU DID NOT HAVE A STEADY PLACE TO SLEEP OR SLEPT IN A SHELTER (INCLUDING NOW)?: NO

## 2023-03-21 ASSESSMENT — PATIENT HEALTH QUESTIONNAIRE - PHQ9
4. FEELING TIRED OR HAVING LITTLE ENERGY: 0
SUM OF ALL RESPONSES TO PHQ QUESTIONS 1-9: 3
SUM OF ALL RESPONSES TO PHQ QUESTIONS 1-9: 3
8. MOVING OR SPEAKING SO SLOWLY THAT OTHER PEOPLE COULD HAVE NOTICED. OR THE OPPOSITE, BEING SO FIGETY OR RESTLESS THAT YOU HAVE BEEN MOVING AROUND A LOT MORE THAN USUAL: 0
10. IF YOU CHECKED OFF ANY PROBLEMS, HOW DIFFICULT HAVE THESE PROBLEMS MADE IT FOR YOU TO DO YOUR WORK, TAKE CARE OF THINGS AT HOME, OR GET ALONG WITH OTHER PEOPLE: 0
9. THOUGHTS THAT YOU WOULD BE BETTER OFF DEAD, OR OF HURTING YOURSELF: 0
7. TROUBLE CONCENTRATING ON THINGS, SUCH AS READING THE NEWSPAPER OR WATCHING TELEVISION: 0
SUM OF ALL RESPONSES TO PHQ QUESTIONS 1-9: 3
2. FEELING DOWN, DEPRESSED OR HOPELESS: 0
SUM OF ALL RESPONSES TO PHQ QUESTIONS 1-9: 3
1. LITTLE INTEREST OR PLEASURE IN DOING THINGS: 0
6. FEELING BAD ABOUT YOURSELF - OR THAT YOU ARE A FAILURE OR HAVE LET YOURSELF OR YOUR FAMILY DOWN: 0
5. POOR APPETITE OR OVEREATING: 0
SUM OF ALL RESPONSES TO PHQ9 QUESTIONS 1 & 2: 0
3. TROUBLE FALLING OR STAYING ASLEEP: 3

## 2023-03-21 NOTE — PROGRESS NOTES
06/29/22 132/68   12/22/21 112/82         Constitutional:  Justino Bean is oriented to place, person and time ,appears well-developed and well-nourished  HEENT:  Atraumatic and normocephalic, external ears normal bilaterally, nose normal no oropharyngeal exudate and is clear and moist  Eyes:  EOCM normal; conjunctivae normal; PERRLA bilaterally  Neck:  Normal range of motion, neck supple, no JVD and no thyromegaly  Cardiovascular:  RRR, normal heart sounds and intact distal pulses  Pulmonary:  effort normal and breath sounds normal bilaterally,no wheezes or rales, no respiratory distress  Abdominal:  Soft, non-tender; normal bowel sounds, no masses  Musculoskeletal:  Normal range of motion and no edema or tenderness bilaterally  No lymphadenopathy  Neurological:  alert, oriented, and normal reflexes bilaterally  Skin: warm and dry  Psychiatric:  normal mood and effect; behavior normal.    Labs:   No results found for: LABA1C  Lab Results   Component Value Date    CHOL 194 12/11/2014     Lab Results   Component Value Date    HDL 43 12/11/2014     Lab Results   Component Value Date    LDLCALC 111 12/11/2014     Lab Results   Component Value Date    TRIG 198 12/11/2014     No results found for: [place], [place]  Lab Results   Component Value Date    WBC 8.8 12/01/2016    HGB 13.9 12/01/2016    HCT 41.8 12/01/2016    MCV 87.4 12/01/2016     12/01/2016     No results found for: INR, PROTIME  Lab Results   Component Value Date    GLUCOSE 104 (H) 12/01/2016    CREATININE 0.54 12/01/2016    BUN 13 12/01/2016     12/01/2016    K 3.7 12/01/2016    CL 99 12/01/2016    CO2 28 12/01/2016     Lab Results   Component Value Date    ALT 20 12/01/2016    AST 17 12/01/2016    ALKPHOS 84 12/01/2016    BILITOT 0.29 (L) 12/01/2016     Lab Results   Component Value Date    LABALBU 4.2 12/01/2016     No results found for: TSH, CBC  Assessment:   Diagnosis Orders   1.  Anxiety  Comprehensive Metabolic Panel    Hemoglobin A1C    Lipid Panel

## 2023-03-23 NOTE — PATIENT INSTRUCTIONS
plan. And think about who could make it harder for you, and how to handle them. Try tracking. People who keep track of what they eat, feel, and do are better at losing weight. Try writing down things like:  What and how much you eat. How you feel before and after each meal.  Details about each meal (like eating out or at home, eating alone, or with friends or family). What you do to be active. Look and plan. As you track, look for patterns that you may want to change. Take note of: When you eat and whether you skip meals. How often you eat out. How many fruits and vegetables you eat. When you eat beyond feeling full. When and why you eat for reasons other than being hungry. When you stray from your plan, don't get upset. Figure out what made you slip up and how you can fix it. Can you take medicines or have surgery to lose weight? If you have a BMI in a certain range and have not been able to lose weight with diet and exercise, medicine or surgery may be an option for you. If you have a BMI of at least 30.0 (or a BMI of at least 27.0 and another health problem related to your weight), ask your doctor about weight-loss medicines. They work by making you feel less hungry, making you feel full more quickly, or changing how you digest fat. Medicines are used along with diet changes and more physical activity to help you make lasting changes. If you have a BMI of 40.0 or more (or a BMI of 35.0 or more and another health problem related to your weight), your doctor may talk with you about surgery. Weight-loss surgery has risks, and you will need to work with your doctor to compare the risk of having obesity with the risks of surgery. With any option you choose, you will still need to eat a healthy diet and get regular exercise. Follow-up care is a key part of your treatment and safety. Be sure to make and go to all appointments, and call your doctor if you are having problems.  It's also a good idea to know

## 2023-03-29 RX ORDER — LEVOCETIRIZINE DIHYDROCHLORIDE 5 MG/1
TABLET, FILM COATED ORAL
Qty: 30 TABLET | Refills: 2 | Status: SHIPPED | OUTPATIENT
Start: 2023-03-29

## 2023-03-29 RX ORDER — DULOXETIN HYDROCHLORIDE 60 MG/1
CAPSULE, DELAYED RELEASE ORAL
Qty: 30 CAPSULE | Refills: 2 | Status: SHIPPED | OUTPATIENT
Start: 2023-03-29

## 2023-03-29 NOTE — TELEPHONE ENCOUNTER
[facility] is calling to request a refill on the following medication(s):    Medication Request:  Requested Prescriptions     Pending Prescriptions Disp Refills    DULoxetine (CYMBALTA) 60 MG extended release capsule [Pharmacy Med Name: DULoxetine HCL DR 60 MG CAPSULE] 30 capsule 2     Sig: TAKE ONE CAPSULE BY MOUTH DAILY    levocetirizine (XYZAL) 5 MG tablet [Pharmacy Med Name: LEVOCETIRIZINE 5 MG TABLET] 30 tablet 2     Sig: TAKE ONE TABLET BY MOUTH ONCE NIGHTLY       Last Visit Date (If Applicable):  4/04/3655    Next Visit Date:    6/21/2023

## 2023-04-17 RX ORDER — PANTOPRAZOLE SODIUM 40 MG/1
TABLET, DELAYED RELEASE ORAL
Qty: 30 TABLET | Refills: 0 | Status: SHIPPED | OUTPATIENT
Start: 2023-04-17

## 2023-04-17 NOTE — TELEPHONE ENCOUNTER
Daquan Schulz is calling to request a refill on the following medication(s):    Last Visit Date (If Applicable):  2/43/1245    Next Visit Date:    6/21/2023    Medication Request:  Requested Prescriptions     Pending Prescriptions Disp Refills    pantoprazole (PROTONIX) 40 MG tablet [Pharmacy Med Name: PANTOPRAZOLE SOD DR 40 MG TAB] 30 tablet 0     Sig: TAKE ONE TABLET BY MOUTH EVERY MORNING BEFORE BREAKFAST

## 2023-05-16 RX ORDER — PANTOPRAZOLE SODIUM 40 MG/1
TABLET, DELAYED RELEASE ORAL
Qty: 30 TABLET | Refills: 0 | Status: SHIPPED | OUTPATIENT
Start: 2023-05-16

## 2023-05-16 NOTE — TELEPHONE ENCOUNTER
Gary Barrett is calling to request a refill on the following medication(s):    Last Visit Date (If Applicable):  0/97/3103    Next Visit Date:    6/21/2023    Medication Request:  Requested Prescriptions     Pending Prescriptions Disp Refills    pantoprazole (PROTONIX) 40 MG tablet [Pharmacy Med Name: PANTOPRAZOLE SOD DR 40 MG TAB] 30 tablet 0     Sig: TAKE ONE TABLET BY MOUTH EVERY MORNING BEFORE BREAKFAST

## 2023-06-20 RX ORDER — PANTOPRAZOLE SODIUM 40 MG/1
TABLET, DELAYED RELEASE ORAL
Qty: 30 TABLET | Refills: 0 | OUTPATIENT
Start: 2023-06-20

## 2023-06-27 RX ORDER — DULOXETIN HYDROCHLORIDE 60 MG/1
CAPSULE, DELAYED RELEASE ORAL
Qty: 90 CAPSULE | Refills: 1 | Status: SHIPPED | OUTPATIENT
Start: 2023-06-27

## 2023-06-29 RX ORDER — ONDANSETRON 4 MG/1
TABLET, FILM COATED ORAL
Qty: 30 TABLET | Refills: 0 | Status: SHIPPED | OUTPATIENT
Start: 2023-06-29

## 2023-09-05 ENCOUNTER — TELEPHONE (OUTPATIENT)
Dept: INTERNAL MEDICINE CLINIC | Age: 43
End: 2023-09-05

## 2023-09-05 NOTE — TELEPHONE ENCOUNTER
Patient has left ear pain with numbness to her face x 2 weeks. States she can't hear out of the ear and has been seen by urgent care in [facility] and also by one in [address] [place] [address] ( she doesn't know the name of either). UC in [place] local ( per care everywhere [place]) dx fluid behind ear and rx amoxicillin. The other UC said inner ear infection and prescribed Cipro ear drops and medication for vertigo and nausea. Both recommended an ENT. Please advise.

## 2023-09-05 NOTE — TELEPHONE ENCOUNTER
Patent notified and she stated she was referred in the past [facility] by Dr James Graves and she will call them to get an appt.

## 2023-09-06 RX ORDER — LEVOCETIRIZINE DIHYDROCHLORIDE 5 MG/1
TABLET, FILM COATED ORAL
Qty: 30 TABLET | Refills: 3 | Status: SHIPPED | OUTPATIENT
Start: 2023-09-06

## 2023-09-06 NOTE — TELEPHONE ENCOUNTER
Dileep Maldonado is calling to request a refill on the following medication(s):    Medication Request:  Requested Prescriptions     Pending Prescriptions Disp Refills    levocetirizine (XYZAL) 5 MG tablet [Pharmacy Med Name: LEVOCETIRIZINE 5 MG TABLET] 30 tablet 2     Sig: TAKE ONE TABLET BY MOUTH ONCE NIGHTLY       Last Visit Date (If Applicable):  8/42/5313    Next Visit Date:    Visit date not found        Last refill 3/29/23. Prescription pending.

## 2023-10-26 ENCOUNTER — TELEPHONE (OUTPATIENT)
Dept: INTERNAL MEDICINE CLINIC | Age: 43
End: 2023-10-26

## 2023-10-26 NOTE — TELEPHONE ENCOUNTER
----- Message from Kasey Redman sent at 10/26/2023  3:57 PM EDT -----  Subject: Appointment Request    Reason for Call: Established Patient Appointment needed: Routine Existing   Condition Follow Up    QUESTIONS    Reason for appointment request? Available appointments did not meet   patient need     Additional Information for Provider? PT NEEDS AN APPT TO GET MED REFILLS. SHE WILL BE OUT IN A WEEK, AND DOES NOT WANT A VIRTUAL APPT.  PLEASE CALL   TO SCHEDULE  ---------------------------------------------------------------------------  --------------  João MACIAS  [phone]; OK to leave message on voicemail  ---------------------------------------------------------------------------  --------------  SCRIPT ANSWERS

## 2023-10-30 ENCOUNTER — OFFICE VISIT (OUTPATIENT)
Dept: INTERNAL MEDICINE CLINIC | Age: 43
End: 2023-10-30
Payer: COMMERCIAL

## 2023-10-30 VITALS
TEMPERATURE: 97.5 F | HEART RATE: 97 BPM | SYSTOLIC BLOOD PRESSURE: 126 MMHG | OXYGEN SATURATION: 98 % | HEIGHT: 63 IN | WEIGHT: 198.4 LBS | BODY MASS INDEX: 35.15 KG/M2 | DIASTOLIC BLOOD PRESSURE: 88 MMHG

## 2023-10-30 DIAGNOSIS — H91.92 DECREASED HEARING OF LEFT EAR: ICD-10-CM

## 2023-10-30 DIAGNOSIS — Z91.09 ENVIRONMENTAL ALLERGIES: ICD-10-CM

## 2023-10-30 DIAGNOSIS — E66.9 OBESITY (BMI 35.0-39.9 WITHOUT COMORBIDITY): ICD-10-CM

## 2023-10-30 DIAGNOSIS — Z12.31 ENCOUNTER FOR SCREENING MAMMOGRAM FOR MALIGNANT NEOPLASM OF BREAST: ICD-10-CM

## 2023-10-30 DIAGNOSIS — H92.02 LEFT EAR PAIN: ICD-10-CM

## 2023-10-30 DIAGNOSIS — F32.A DEPRESSION, UNSPECIFIED DEPRESSION TYPE: ICD-10-CM

## 2023-10-30 DIAGNOSIS — G47.00 INSOMNIA, UNSPECIFIED TYPE: Primary | ICD-10-CM

## 2023-10-30 PROCEDURE — 99214 OFFICE O/P EST MOD 30 MIN: CPT | Performed by: INTERNAL MEDICINE

## 2023-10-30 RX ORDER — ALPRAZOLAM 0.5 MG/1
0.5 TABLET ORAL NIGHTLY PRN
Qty: 30 TABLET | Refills: 0 | Status: SHIPPED | OUTPATIENT
Start: 2023-10-30 | End: 2023-11-29

## 2023-10-30 RX ORDER — SUMATRIPTAN 100 MG/1
TABLET, FILM COATED ORAL
Qty: 9 TABLET | Refills: 5 | Status: SHIPPED | OUTPATIENT
Start: 2023-10-30

## 2023-10-30 RX ORDER — TRIAMCINOLONE ACETONIDE 55 UG/1
2 SPRAY, METERED NASAL DAILY
Qty: 3 EACH | Refills: 0 | Status: SHIPPED | OUTPATIENT
Start: 2023-10-30

## 2023-10-30 RX ORDER — BIFIDOBACTERIUM LONGUM SUBSP. INFANTIS, AVOBENZONE, HOMOSALATE, OCTISALATE, OCTOCRYLENE, AND OXYBENZONE
1 KIT DAILY
Qty: 90 TABLET | Refills: 1 | Status: SHIPPED | OUTPATIENT
Start: 2023-10-30

## 2023-10-30 NOTE — PROGRESS NOTES
malignant neoplasm of breast    3. Environmental allergies    4. Depression, unspecified depression type    5. Obesity (BMI 35.0-39.9 without comorbidity)    6. Left ear pain    7. Decreased hearing of left ear        Plan:  Patient has seen the ENT for left ear pain and decreased hearing and reports reviewed and patient was advised to follow-up with dentist for possible TMJ and then rescheduled for hearing test and patient is appoint with both of them in the next few weeks and advised patient to call me back after the evaluation  Patient is taking Xanax for anxiety and sleep and refills given and takes only as needed and PDMP reviewed  Patient's depression stable and continue current treatment and patient is on Cymbalta and Lamictal  Screening mammogram ordered  Patient had a colonoscopy done in 2017 and advised patient to follow with GI and contact number given check with them if she needs repeat colonoscopy  Review in 4 months           1. Heena Friend received counseling on the following healthy behaviors: nutrition and exercise    2. Prior labs and health maintenance reviewed. 3.  Discussed use, benefit, and side effects of prescribed medications. Barriers to medication compliance addressed. All her questions were answered. Pt voiced understanding. Heena Friend will continue current medications, diet and exercise. Orders Placed This Encounter   Medications    ALPRAZolam (XANAX) 0.5 MG tablet     Sig: Take 1 tablet by mouth nightly as needed for Sleep for up to 30 days.      Dispense:  30 tablet     Refill:  0    Bacillus Coagulans-Inulin (ALIGN PREBIOTIC-PROBIOTIC) 5-1.25 MG-GM CHEW     Sig: Take 1 tablet by mouth daily     Dispense:  90 tablet     Refill:  1    SUMAtriptan (IMITREX) 100 MG tablet     Sig: TAKE ONE TABLET BY MOUTH ONCE AT ONSET OF HEADACHE     Dispense:  9 tablet     Refill:  5    triamcinolone (NASACORT ALLERGY 24HR) 55 MCG/ACT nasal inhaler     Si sprays by Each Nostril

## 2024-01-16 LAB
ESTIMATED AVERAGE GLUCOSE: 140
HBA1C MFR BLD: 6.5 %

## 2024-01-17 DIAGNOSIS — G47.00 INSOMNIA, UNSPECIFIED TYPE: ICD-10-CM

## 2024-01-17 DIAGNOSIS — Z91.09 ENVIRONMENTAL ALLERGIES: ICD-10-CM

## 2024-01-17 DIAGNOSIS — F41.9 ANXIETY: ICD-10-CM

## 2024-01-17 DIAGNOSIS — E66.9 OBESITY (BMI 35.0-39.9 WITHOUT COMORBIDITY): ICD-10-CM

## 2024-01-17 DIAGNOSIS — F32.A DEPRESSION, UNSPECIFIED DEPRESSION TYPE: ICD-10-CM

## 2024-01-19 DIAGNOSIS — E78.00 HIGH CHOLESTEROL: Primary | ICD-10-CM

## 2024-01-19 DIAGNOSIS — E55.9 VITAMIN D DEFICIENCY: ICD-10-CM

## 2024-01-19 DIAGNOSIS — R73.9 HYPERGLYCEMIA: ICD-10-CM

## 2024-01-19 DIAGNOSIS — E53.8 VITAMIN B12 DEFICIENCY: ICD-10-CM

## 2024-01-19 RX ORDER — DULOXETIN HYDROCHLORIDE 60 MG/1
CAPSULE, DELAYED RELEASE ORAL
Qty: 30 CAPSULE | Refills: 0 | Status: SHIPPED | OUTPATIENT
Start: 2024-01-19

## 2024-01-19 RX ORDER — CHOLECALCIFEROL (VITAMIN D3) 125 MCG
500 CAPSULE ORAL DAILY
Qty: 90 TABLET | Refills: 1 | Status: SHIPPED | OUTPATIENT
Start: 2024-01-19 | End: 2025-01-18

## 2024-01-19 RX ORDER — ERGOCALCIFEROL 1.25 MG/1
50000 CAPSULE ORAL WEEKLY
Qty: 12 CAPSULE | Refills: 0 | Status: SHIPPED | OUTPATIENT
Start: 2024-01-19

## 2024-01-19 RX ORDER — ATORVASTATIN CALCIUM 20 MG/1
20 TABLET, FILM COATED ORAL DAILY
Qty: 90 TABLET | Refills: 1 | Status: SHIPPED | OUTPATIENT
Start: 2024-01-19

## 2024-01-19 NOTE — TELEPHONE ENCOUNTER
Dayana Pinon is calling to request a refill on the following medication(s):    Medication Request:  Requested Prescriptions     Pending Prescriptions Disp Refills    DULoxetine (CYMBALTA) 60 MG extended release capsule 30 capsule 0     Sig: TAKE 1 CAPSULE BY MOUTH DAILY       Last Visit Date (If Applicable):  10/30/2023    Next Visit Date:    2/28/2024

## 2024-01-24 ENCOUNTER — OFFICE VISIT (OUTPATIENT)
Dept: INTERNAL MEDICINE CLINIC | Age: 44
End: 2024-01-24
Payer: COMMERCIAL

## 2024-01-24 VITALS
TEMPERATURE: 97.7 F | SYSTOLIC BLOOD PRESSURE: 116 MMHG | BODY MASS INDEX: 35.54 KG/M2 | DIASTOLIC BLOOD PRESSURE: 82 MMHG | OXYGEN SATURATION: 99 % | HEIGHT: 63 IN | RESPIRATION RATE: 24 BRPM | WEIGHT: 200.6 LBS | HEART RATE: 97 BPM

## 2024-01-24 DIAGNOSIS — E66.09 CLASS 1 OBESITY DUE TO EXCESS CALORIES WITHOUT SERIOUS COMORBIDITY WITH BODY MASS INDEX (BMI) OF 34.0 TO 34.9 IN ADULT: ICD-10-CM

## 2024-01-24 DIAGNOSIS — M25.541 ARTHRALGIA OF RIGHT HAND: Primary | ICD-10-CM

## 2024-01-24 DIAGNOSIS — E53.8 VITAMIN B12 DEFICIENCY: ICD-10-CM

## 2024-01-24 DIAGNOSIS — E78.00 HIGH CHOLESTEROL: ICD-10-CM

## 2024-01-24 DIAGNOSIS — G47.00 INSOMNIA, UNSPECIFIED TYPE: ICD-10-CM

## 2024-01-24 DIAGNOSIS — E55.9 VITAMIN D DEFICIENCY: ICD-10-CM

## 2024-01-24 DIAGNOSIS — E11.9 TYPE 2 DIABETES MELLITUS WITHOUT COMPLICATION, WITHOUT LONG-TERM CURRENT USE OF INSULIN (HCC): ICD-10-CM

## 2024-01-24 PROCEDURE — 3044F HG A1C LEVEL LT 7.0%: CPT | Performed by: INTERNAL MEDICINE

## 2024-01-24 PROCEDURE — 99215 OFFICE O/P EST HI 40 MIN: CPT | Performed by: INTERNAL MEDICINE

## 2024-01-24 RX ORDER — DULAGLUTIDE 0.75 MG/.5ML
0.75 INJECTION, SOLUTION SUBCUTANEOUS WEEKLY
Qty: 3 ML | Refills: 0 | Status: SHIPPED | OUTPATIENT
Start: 2024-01-24

## 2024-01-24 RX ORDER — ALPRAZOLAM 0.5 MG/1
0.5 TABLET ORAL NIGHTLY PRN
Qty: 30 TABLET | Refills: 0 | Status: SHIPPED | OUTPATIENT
Start: 2024-01-24 | End: 2024-02-23

## 2024-01-24 ASSESSMENT — PATIENT HEALTH QUESTIONNAIRE - PHQ9
6. FEELING BAD ABOUT YOURSELF - OR THAT YOU ARE A FAILURE OR HAVE LET YOURSELF OR YOUR FAMILY DOWN: 0
SUM OF ALL RESPONSES TO PHQ QUESTIONS 1-9: 0
SUM OF ALL RESPONSES TO PHQ QUESTIONS 1-9: 0
7. TROUBLE CONCENTRATING ON THINGS, SUCH AS READING THE NEWSPAPER OR WATCHING TELEVISION: 0
5. POOR APPETITE OR OVEREATING: 0
SUM OF ALL RESPONSES TO PHQ QUESTIONS 1-9: 0
SUM OF ALL RESPONSES TO PHQ QUESTIONS 1-9: 0
1. LITTLE INTEREST OR PLEASURE IN DOING THINGS: 0
8. MOVING OR SPEAKING SO SLOWLY THAT OTHER PEOPLE COULD HAVE NOTICED. OR THE OPPOSITE, BEING SO FIGETY OR RESTLESS THAT YOU HAVE BEEN MOVING AROUND A LOT MORE THAN USUAL: 0
10. IF YOU CHECKED OFF ANY PROBLEMS, HOW DIFFICULT HAVE THESE PROBLEMS MADE IT FOR YOU TO DO YOUR WORK, TAKE CARE OF THINGS AT HOME, OR GET ALONG WITH OTHER PEOPLE: 0
2. FEELING DOWN, DEPRESSED OR HOPELESS: 0
4. FEELING TIRED OR HAVING LITTLE ENERGY: 0
3. TROUBLE FALLING OR STAYING ASLEEP: 0
9. THOUGHTS THAT YOU WOULD BE BETTER OFF DEAD, OR OF HURTING YOURSELF: 0
SUM OF ALL RESPONSES TO PHQ9 QUESTIONS 1 & 2: 0

## 2024-01-24 NOTE — PROGRESS NOTES
Dayana Pinon is a 43 y.o. female who presents for   Chief Complaint   Patient presents with    Mass     Patient has pain bumps on finger tips    Discuss Labs     Labs in Epic    Discuss Medications     Patient would like to discuss Trulicity for weight loss    Medication Refill     Medication pended    Health Maintenance     [place], flu, covid, hep b c, hiv    and follow up of chronic medical problems.  Patient Active Problem List   Diagnosis    Migraine    Acne    Hypercholesteremia    Atrial septal defect    SVT (supraventricular tachycardia)    Depression    Anxiety    Seasonal allergies    Chronic shoulder pain    Sprain, knee    Cervical strain    Chronic rhinitis     HPI  Here for follow-up on anxiety and also wants to discuss lab work    Current Outpatient Medications   Medication Sig Dispense Refill    ALPRAZolam (XANAX) 0.5 MG tablet Take 1 tablet by mouth nightly as needed for Sleep for up to 30 days. 30 tablet 0    Dulaglutide (TRULICITY) 0.75 MG/0.5ML SOPN Inject 0.75 mg into the skin once a week 3 mL 0    atorvastatin (LIPITOR) 20 MG tablet Take 1 tablet by mouth daily 90 tablet 1    DULoxetine (CYMBALTA) 60 MG extended release capsule TAKE 1 CAPSULE BY MOUTH DAILY 30 capsule 0    Bacillus Coagulans-Inulin (ALIGN PREBIOTIC-PROBIOTIC) 5-1.25 MG-GM CHEW Take 1 tablet by mouth daily 90 tablet 1    SUMAtriptan (IMITREX) 100 MG tablet TAKE ONE TABLET BY MOUTH ONCE AT ONSET OF HEADACHE 9 tablet 5    triamcinolone (NASACORT ALLERGY 24HR) 55 MCG/ACT nasal inhaler 2 sprays by Each Nostril route daily 3 each 0    levocetirizine (XYZAL) 5 MG tablet TAKE ONE TABLET BY MOUTH ONCE NIGHTLY 30 tablet 3    ondansetron (ZOFRAN) 4 MG tablet TAKE ONE TABLET BY MOUTH DAILY AS NEEDED FOR NAUSEA OR VOMITING 30 tablet 0    pantoprazole (PROTONIX) 40 MG tablet TAKE ONE TABLET BY MOUTH EVERY MORNING BEFORE BREAKFAST 30 tablet 0    spironolactone (ALDACTONE) 50 MG tablet Take 1 tablet by mouth 2 times daily TAKE ONE TABLET BY

## 2024-02-05 RX ORDER — LEVOCETIRIZINE DIHYDROCHLORIDE 5 MG/1
TABLET, FILM COATED ORAL
Qty: 30 TABLET | Refills: 5 | Status: SHIPPED | OUTPATIENT
Start: 2024-02-05

## 2024-02-05 NOTE — TELEPHONE ENCOUNTER
Dayana Pinon is calling to request a refill on the following medication(s):    Medication Request:  Requested Prescriptions     Pending Prescriptions Disp Refills    levocetirizine (XYZAL) 5 MG tablet [Pharmacy Med Name: LEVOCETIRIZINE 5 MG TABLET] 30 tablet 3     Sig: TAKE ONE TABLET BY MOUTH ONCE NIGHTLY       Last Visit Date (If Applicable):  1/24/2024    Next Visit Date:    4/24/2024    Last refill 9/6/23. Prescription pending.

## 2024-02-09 ENCOUNTER — TELEPHONE (OUTPATIENT)
Dept: INTERNAL MEDICINE CLINIC | Age: 44
End: 2024-02-09

## 2024-02-13 ENCOUNTER — TELEPHONE (OUTPATIENT)
Dept: INTERNAL MEDICINE CLINIC | Age: 44
End: 2024-02-13

## 2024-02-13 DIAGNOSIS — E11.9 TYPE 2 DIABETES MELLITUS WITHOUT COMPLICATION, WITHOUT LONG-TERM CURRENT USE OF INSULIN (HCC): Primary | ICD-10-CM

## 2024-02-13 NOTE — TELEPHONE ENCOUNTER
Patient called back and was notified of your recommendations and would like the metformin sent to her pharmacy. Pt will do the blood work as advised. Orders entered in epic.

## 2024-02-13 NOTE — TELEPHONE ENCOUNTER
Dayana Pinon is calling to request a refill on the following medication(s):    Medication Request:  Requested Prescriptions     Pending Prescriptions Disp Refills    metFORMIN (GLUCOPHAGE) 500 MG tablet 180 tablet 1     Sig: Take 1 tablet by mouth 2 times daily (with meals)       Last Visit Date (If Applicable):  1/24/2024    Next Visit Date:    4/24/2024    New prescription.

## 2024-02-23 RX ORDER — DULOXETIN HYDROCHLORIDE 60 MG/1
CAPSULE, DELAYED RELEASE ORAL
Qty: 30 CAPSULE | Refills: 5 | Status: SHIPPED | OUTPATIENT
Start: 2024-02-23

## 2024-02-23 NOTE — TELEPHONE ENCOUNTER
Dayana Pinon is calling to request a refill on the following medication(s):    Medication Request:  Requested Prescriptions     Pending Prescriptions Disp Refills    DULoxetine (CYMBALTA) 60 MG extended release capsule [Pharmacy Med Name: DULoxetine HCL DR 60 MG CAPSULE] 30 capsule 0     Sig: TAKE 1 CAPSULE BY MOUTH DAILY       Last Visit Date (If Applicable):  1/24/2024    Next Visit Date:    4/24/2024      Last refill 1/19/24. Prescription pending.

## 2024-04-17 RX ORDER — ERGOCALCIFEROL 1.25 MG/1
50000 CAPSULE ORAL WEEKLY
Qty: 12 CAPSULE | Refills: 0 | Status: SHIPPED | OUTPATIENT
Start: 2024-04-17

## 2024-04-17 NOTE — TELEPHONE ENCOUNTER
Dayana Pinon is calling to request a refill on the following medication(s):    Medication Request:  Requested Prescriptions     Pending Prescriptions Disp Refills    vitamin D (ERGOCALCIFEROL) 1.25 MG (87705 UT) CAPS capsule [Pharmacy Med Name: VITAMIN D2 1.25MG(50,000 UNIT)] 12 capsule 0     Sig: TAKE 1 CAPSULE BY MOUTH ONCE A WEEK       Last Visit Date (If Applicable):  1/24/2024    Next Visit Date:    4/24/2024      Last refill 1/19/24. Prescription pending.

## 2024-07-26 RX ORDER — ATORVASTATIN CALCIUM 20 MG/1
20 TABLET, FILM COATED ORAL DAILY
Qty: 30 TABLET | Refills: 1 | Status: SHIPPED | OUTPATIENT
Start: 2024-07-26

## 2024-07-26 NOTE — TELEPHONE ENCOUNTER
Dayana Pinon is calling to request a refill on the following medication(s):    Medication Request:  Requested Prescriptions     Pending Prescriptions Disp Refills    atorvastatin (LIPITOR) 20 MG tablet [Pharmacy Med Name: ATORVASTATIN 20 MG TABLET] 30 tablet      Sig: TAKE 1 TABLET BY MOUTH DAILY       Last Visit Date (If Applicable):  1/24/2024    Next Visit Date:  no upcoming appts scheduled    Last refill 1/19/24.prescription pending.

## 2024-08-06 RX ORDER — LEVOCETIRIZINE DIHYDROCHLORIDE 5 MG/1
TABLET, FILM COATED ORAL
Qty: 30 TABLET | Refills: 5 | Status: SHIPPED | OUTPATIENT
Start: 2024-08-06

## 2024-08-06 NOTE — TELEPHONE ENCOUNTER
Dayana Pinon is calling to request a refill on the following medication(s):    Last Visit Date (If Applicable):  1/24/2024    Next Visit Date:    Visit date not found    Medication Request:  Requested Prescriptions     Pending Prescriptions Disp Refills    metFORMIN (GLUCOPHAGE) 500 MG tablet [Pharmacy Med Name: METFORMIN  MG TABLET] 60 tablet      Sig: TAKE 1 TABLET BY MOUTH TWICE A DAY WITH A MEAL    levocetirizine (XYZAL) 5 MG tablet [Pharmacy Med Name: LEVOCETIRIZINE 5 MG TABLET] 30 tablet 5     Sig: TAKE ONE TABLET BY MOUTH ONCE NIGHTLY

## 2024-08-27 RX ORDER — DULOXETIN HYDROCHLORIDE 60 MG/1
CAPSULE, DELAYED RELEASE ORAL
Qty: 30 CAPSULE | Refills: 1 | Status: SHIPPED | OUTPATIENT
Start: 2024-08-27

## 2024-08-27 NOTE — TELEPHONE ENCOUNTER
Dayana Pinon is calling to request a refill on the following medication(s):    Medication Request:  Requested Prescriptions     Pending Prescriptions Disp Refills    DULoxetine (CYMBALTA) 60 MG extended release capsule [Pharmacy Med Name: DULoxetine HCL DR 60 MG CAPSULE] 30 capsule 5     Sig: TAKE 1 CAPSULE BY MOUTH DAILY       Last Visit Date (If Applicable):  1/24/2024    Next Visit Date:    Visit date not found      Last refill 2/23/24. Prescription pending.

## 2024-09-24 RX ORDER — ATORVASTATIN CALCIUM 20 MG/1
20 TABLET, FILM COATED ORAL DAILY
Qty: 30 TABLET | Refills: 1 | Status: SHIPPED | OUTPATIENT
Start: 2024-09-24

## 2024-10-17 NOTE — TELEPHONE ENCOUNTER
Dayana Pinon is calling to request a refill on the following medication(s):    Medication Request:  Requested Prescriptions     Pending Prescriptions Disp Refills    metFORMIN (GLUCOPHAGE) 500 MG tablet [Pharmacy Med Name: METFORMIN  MG TABLET] 60 tablet 0     Sig: TAKE 1 TABLET BY MOUTH TWICE A DAY WITH A MEAL       Last Visit Date (If Applicable):  1/24/2024    Next Visit Date:    Visit date not found

## 2024-10-25 RX ORDER — DULOXETIN HYDROCHLORIDE 60 MG/1
CAPSULE, DELAYED RELEASE ORAL
Qty: 30 CAPSULE | Refills: 1 | OUTPATIENT
Start: 2024-10-25

## 2024-11-25 RX ORDER — ATORVASTATIN CALCIUM 20 MG/1
20 TABLET, FILM COATED ORAL DAILY
Qty: 30 TABLET | Refills: 1 | Status: SHIPPED | OUTPATIENT
Start: 2024-11-25

## 2024-11-25 NOTE — TELEPHONE ENCOUNTER
Dayana Pinon is calling to request a refill on the following medication(s):    Medication Request:  Requested Prescriptions     Pending Prescriptions Disp Refills    metFORMIN (GLUCOPHAGE) 500 MG tablet [Pharmacy Med Name: METFORMIN  MG TABLET] 60 tablet 0     Sig: TAKE 1 TABLET BY MOUTH TWICE A DAY WITH A MEAL    atorvastatin (LIPITOR) 20 MG tablet [Pharmacy Med Name: ATORVASTATIN 20 MG TABLET] 30 tablet 1     Sig: TAKE 1 TABLET BY MOUTH DAILY       Last Visit Date (If Applicable):  1/24/2024    Next Visit Date:    Visit date not found

## 2025-03-05 RX ORDER — LEVOCETIRIZINE DIHYDROCHLORIDE 5 MG/1
TABLET, FILM COATED ORAL
Qty: 30 TABLET | Refills: 5 | OUTPATIENT
Start: 2025-03-05

## 2025-04-11 ENCOUNTER — OFFICE VISIT (OUTPATIENT)
Age: 45
End: 2025-04-11

## 2025-04-11 VITALS
RESPIRATION RATE: 16 BRPM | WEIGHT: 190 LBS | HEART RATE: 93 BPM | BODY MASS INDEX: 34.96 KG/M2 | TEMPERATURE: 98 F | OXYGEN SATURATION: 94 % | DIASTOLIC BLOOD PRESSURE: 78 MMHG | HEIGHT: 62 IN | SYSTOLIC BLOOD PRESSURE: 122 MMHG

## 2025-04-11 DIAGNOSIS — H69.93 DYSFUNCTION OF BOTH EUSTACHIAN TUBES: ICD-10-CM

## 2025-04-11 DIAGNOSIS — R09.82 POST-NASAL DRIP: ICD-10-CM

## 2025-04-11 DIAGNOSIS — H65.93 FLUID LEVEL BEHIND TYMPANIC MEMBRANE OF BOTH EARS: ICD-10-CM

## 2025-04-11 DIAGNOSIS — J01.41 ACUTE RECURRENT PANSINUSITIS: Primary | ICD-10-CM

## 2025-04-11 RX ORDER — AZITHROMYCIN 250 MG/1
TABLET, FILM COATED ORAL
Qty: 1 PACKET | Refills: 0 | Status: SHIPPED | OUTPATIENT
Start: 2025-04-11

## 2025-04-11 RX ORDER — DEXTROMETHORPHAN HYDROBROMIDE, GUAIFENESIN AND PSEUDOEPHEDRINE HYDROCHLORIDE 15; 400; 60 MG/1; MG/1; MG/1
1 TABLET ORAL 3 TIMES DAILY PRN
Qty: 30 TABLET | Refills: 0 | Status: SHIPPED | OUTPATIENT
Start: 2025-04-11 | End: 2025-04-21

## 2025-04-11 RX ORDER — DULOXETIN HYDROCHLORIDE 20 MG/1
CAPSULE, DELAYED RELEASE ORAL
COMMUNITY

## 2025-04-11 RX ORDER — ONDANSETRON 4 MG/1
4 TABLET, ORALLY DISINTEGRATING ORAL 3 TIMES DAILY PRN
Qty: 21 TABLET | Refills: 0 | Status: SHIPPED | OUTPATIENT
Start: 2025-04-11

## 2025-04-11 ASSESSMENT — ENCOUNTER SYMPTOMS
CHEST TIGHTNESS: 0
TROUBLE SWALLOWING: 0
SINUS PRESSURE: 1
EYE PAIN: 0
ABDOMINAL PAIN: 0
DIARRHEA: 0
CONSTIPATION: 0
VOMITING: 0
COLOR CHANGE: 0
SWOLLEN GLANDS: 0
SHORTNESS OF BREATH: 0
BACK PAIN: 0
SORE THROAT: 0
VOICE CHANGE: 0
EYE REDNESS: 0
COUGH: 0
NAUSEA: 1
HOARSE VOICE: 1
SINUS COMPLAINT: 1

## 2025-04-11 NOTE — PROGRESS NOTES
amount of fluids throughout the day to prevent dehydration.  - Rest. Advance activities and diet as tolerated.  - Take the prescribed medications as instructed.  - Tylenol / Motrin as needed for pain and fever and follow up with your PCP as instructed.  - Patient was instructed to return to the Urgent care or go to the closest ER if symptoms persist, worsen or any other concerns. Patient verbalized understanding.    An electronic signature was used to authenticate this note.    --MARZENA Mendes - CNP

## 2025-06-05 ENCOUNTER — OFFICE VISIT (OUTPATIENT)
Dept: FAMILY MEDICINE CLINIC | Age: 45
End: 2025-06-05
Payer: COMMERCIAL

## 2025-06-05 VITALS
SYSTOLIC BLOOD PRESSURE: 122 MMHG | OXYGEN SATURATION: 97 % | DIASTOLIC BLOOD PRESSURE: 78 MMHG | HEIGHT: 63 IN | HEART RATE: 95 BPM | BODY MASS INDEX: 34.02 KG/M2 | WEIGHT: 192 LBS | TEMPERATURE: 97.2 F

## 2025-06-05 DIAGNOSIS — Z12.31 ENCOUNTER FOR SCREENING MAMMOGRAM FOR MALIGNANT NEOPLASM OF BREAST: ICD-10-CM

## 2025-06-05 DIAGNOSIS — R11.2 NAUSEA AND VOMITING, UNSPECIFIED VOMITING TYPE: ICD-10-CM

## 2025-06-05 DIAGNOSIS — Z13.6 SCREENING FOR CARDIOVASCULAR CONDITION: ICD-10-CM

## 2025-06-05 DIAGNOSIS — J31.0 CHRONIC RHINITIS: ICD-10-CM

## 2025-06-05 DIAGNOSIS — Z13.0 SCREENING FOR DEFICIENCY ANEMIA: ICD-10-CM

## 2025-06-05 DIAGNOSIS — E78.00 HYPERCHOLESTEREMIA: ICD-10-CM

## 2025-06-05 DIAGNOSIS — K58.0 IRRITABLE BOWEL SYNDROME WITH DIARRHEA: ICD-10-CM

## 2025-06-05 DIAGNOSIS — Z13.29 SCREENING FOR THYROID DISORDER: ICD-10-CM

## 2025-06-05 DIAGNOSIS — Z76.89 ENCOUNTER TO ESTABLISH CARE: Primary | ICD-10-CM

## 2025-06-05 DIAGNOSIS — E11.9 TYPE 2 DIABETES MELLITUS WITHOUT COMPLICATION, WITHOUT LONG-TERM CURRENT USE OF INSULIN (HCC): ICD-10-CM

## 2025-06-05 DIAGNOSIS — Z13.21 ENCOUNTER FOR VITAMIN DEFICIENCY SCREENING: ICD-10-CM

## 2025-06-05 PROCEDURE — 99215 OFFICE O/P EST HI 40 MIN: CPT | Performed by: NURSE PRACTITIONER

## 2025-06-05 RX ORDER — LEVOCETIRIZINE DIHYDROCHLORIDE 5 MG/1
5 TABLET, FILM COATED ORAL NIGHTLY
Qty: 30 TABLET | Refills: 5 | Status: SHIPPED | OUTPATIENT
Start: 2025-06-05

## 2025-06-05 RX ORDER — ONDANSETRON 4 MG/1
4 TABLET, FILM COATED ORAL DAILY
Qty: 30 TABLET | Refills: 2 | Status: SHIPPED | OUTPATIENT
Start: 2025-06-05

## 2025-06-05 SDOH — ECONOMIC STABILITY: FOOD INSECURITY: WITHIN THE PAST 12 MONTHS, YOU WORRIED THAT YOUR FOOD WOULD RUN OUT BEFORE YOU GOT MONEY TO BUY MORE.: NEVER TRUE

## 2025-06-05 SDOH — ECONOMIC STABILITY: FOOD INSECURITY: WITHIN THE PAST 12 MONTHS, THE FOOD YOU BOUGHT JUST DIDN'T LAST AND YOU DIDN'T HAVE MONEY TO GET MORE.: NEVER TRUE

## 2025-06-05 ASSESSMENT — ENCOUNTER SYMPTOMS
EYE PAIN: 0
RESPIRATORY NEGATIVE: 1
ABDOMINAL PAIN: 0
SHORTNESS OF BREATH: 0
CHEST TIGHTNESS: 0
ALLERGIC/IMMUNOLOGIC NEGATIVE: 1
EYES NEGATIVE: 1

## 2025-06-05 ASSESSMENT — PATIENT HEALTH QUESTIONNAIRE - PHQ9
SUM OF ALL RESPONSES TO PHQ QUESTIONS 1-9: 0
6. FEELING BAD ABOUT YOURSELF - OR THAT YOU ARE A FAILURE OR HAVE LET YOURSELF OR YOUR FAMILY DOWN: NOT AT ALL
9. THOUGHTS THAT YOU WOULD BE BETTER OFF DEAD, OR OF HURTING YOURSELF: NOT AT ALL
2. FEELING DOWN, DEPRESSED OR HOPELESS: NOT AT ALL
8. MOVING OR SPEAKING SO SLOWLY THAT OTHER PEOPLE COULD HAVE NOTICED. OR THE OPPOSITE, BEING SO FIGETY OR RESTLESS THAT YOU HAVE BEEN MOVING AROUND A LOT MORE THAN USUAL: NOT AT ALL
SUM OF ALL RESPONSES TO PHQ QUESTIONS 1-9: 0
5. POOR APPETITE OR OVEREATING: NOT AT ALL
10. IF YOU CHECKED OFF ANY PROBLEMS, HOW DIFFICULT HAVE THESE PROBLEMS MADE IT FOR YOU TO DO YOUR WORK, TAKE CARE OF THINGS AT HOME, OR GET ALONG WITH OTHER PEOPLE: NOT DIFFICULT AT ALL
SUM OF ALL RESPONSES TO PHQ QUESTIONS 1-9: 0
7. TROUBLE CONCENTRATING ON THINGS, SUCH AS READING THE NEWSPAPER OR WATCHING TELEVISION: NOT AT ALL
4. FEELING TIRED OR HAVING LITTLE ENERGY: NOT AT ALL
1. LITTLE INTEREST OR PLEASURE IN DOING THINGS: NOT AT ALL
3. TROUBLE FALLING OR STAYING ASLEEP: NOT AT ALL
SUM OF ALL RESPONSES TO PHQ QUESTIONS 1-9: 0

## 2025-06-05 NOTE — PROGRESS NOTES
[facility]     Date of Visit:  2025  Patient Name: Dayana Pinon   Patient :  1980     CHIEF COMPLAINT:     Dayana Pinon is a 45 y.o. female who presents today for an general visit to be evaluated for the following condition(s):  Chief Complaint   Patient presents with    Establish Care       HISTORY OF PRESENT ILLNESS:       HPI     Here today to establish care with provider.  Previous provider was Dr. Rincon at [facility].     She has a history of GI issues including constipation, diarrhea, nausea, and vomiting. She has seen GI, Dr. Erazo at [facility], in the past, but did not feel she was really heard and worked up as she felt she needed to be.  She prefers to have a female provider, but is open to someone that is willing to listen and take time with her.  Despite dietary efforts, she has still been unable to manage symptoms.  Reports 1-2 times a month she ends up in the fetal position in the bathroom, vomiting and diarrhea at the same time for sometimes up to an hour, then sleeping for hours afterwards and waking up feeling back to normal.  Denies any known triggers at this time.     Previous A1c was 6.5% 2024, has not had an updated one since.  She was taking metformin in the past for diabetes, but it worsened GI symptoms, so she stopped taking. She is not currently taking any medication for diabetes management.  She has made dietary changes to accommodate GI concerns.     CARE GAPS  Colon CA screening- due, referral provided for GI  Vaccines-  Mammogram- due, ordered  Cervical CA screening-  Hep C/HIV screening-  PHQ-9- 0    Sees Psych, they prescribe duloxetine  REVIEW OF SYSTEMS:      Review of Systems   Constitutional: Negative.  Negative for activity change and appetite change.   HENT: Negative.  Negative for ear pain, hearing loss and tinnitus.    Eyes: Negative.  Negative for pain and visual disturbance.   Respiratory: Negative.  Negative for chest

## 2025-06-06 ASSESSMENT — ENCOUNTER SYMPTOMS
DIARRHEA: 1
NAUSEA: 1
CONSTIPATION: 1
VOMITING: 1

## 2025-06-12 LAB
ALBUMIN: 4.9 G/DL
ALBUMIN: NORMAL
ALP BLD-CCNC: 79 U/L
ALP BLD-CCNC: NORMAL U/L
ALT SERPL-CCNC: 23 U/L
ALT SERPL-CCNC: NORMAL U/L
ANION GAP SERPL CALCULATED.3IONS-SCNC: NORMAL MMOL/L
AST SERPL-CCNC: 22 U/L
AST SERPL-CCNC: NORMAL U/L
BASOPHILS ABSOLUTE: NORMAL
BASOPHILS RELATIVE PERCENT: NORMAL
BILIRUB SERPL-MCNC: 0.4 MG/DL (ref 0.1–1.4)
BILIRUB SERPL-MCNC: NORMAL MG/DL
BUN BLDV-MCNC: 11 MG/DL
BUN BLDV-MCNC: NORMAL MG/DL
CALCIUM SERPL-MCNC: 9.4 MG/DL
CALCIUM SERPL-MCNC: NORMAL MG/DL
CHLORIDE BLD-SCNC: 104 MMOL/L
CHLORIDE BLD-SCNC: NORMAL MMOL/L
CHOLESTEROL, TOTAL: 262 MG/DL
CHOLESTEROL/HDL RATIO: 6.1
CO2: 29 MMOL/L
CO2: NORMAL
CREAT SERPL-MCNC: 0.69 MG/DL
CREAT SERPL-MCNC: NORMAL MG/DL
CREATININE URINE: 213.6 MG/DL
EOSINOPHILS ABSOLUTE: NORMAL
EOSINOPHILS RELATIVE PERCENT: NORMAL
ESTIMATED AVERAGE GLUCOSE: 120
FOLATE: 9.6
GFR, ESTIMATED: 107.7
GFR, ESTIMATED: NORMAL
GLUCOSE BLD-MCNC: 106 MG/DL
GLUCOSE FASTING: NORMAL
HBA1C MFR BLD: 6 %
HCT VFR BLD CALC: 43.5 % (ref 36–46)
HDLC SERPL-MCNC: 43 MG/DL (ref 35–70)
HEMOGLOBIN: 14.8 G/DL (ref 12–16)
LDL CHOLESTEROL: 183
LYMPHOCYTES ABSOLUTE: NORMAL
LYMPHOCYTES RELATIVE PERCENT: NORMAL
MCH RBC QN AUTO: 31.4 PG
MCHC RBC AUTO-ENTMCNC: 34 G/DL
MCV RBC AUTO: 92.2 FL
MICROALBUMIN/CREAT 24H UR: 1 MG/DL
MICROALBUMIN/CREAT UR-RTO: 4.7 MG/G
MONOCYTES ABSOLUTE: NORMAL
MONOCYTES RELATIVE PERCENT: NORMAL
NEUTROPHILS ABSOLUTE: NORMAL
NEUTROPHILS RELATIVE PERCENT: NORMAL
NONHDLC SERPL-MCNC: NORMAL MG/DL
PLATELET # BLD: 362 K/ΜL
PMV BLD AUTO: NORMAL FL
POTASSIUM SERPL-SCNC: 4.7 MMOL/L
POTASSIUM SERPL-SCNC: NORMAL MMOL/L
RBC # BLD: 4.72 10^6/ΜL
SODIUM BLD-SCNC: 142 MMOL/L
SODIUM BLD-SCNC: NORMAL MMOL/L
TOTAL PROTEIN: 7.5 G/DL (ref 6.4–8.2)
TOTAL PROTEIN: NORMAL
TRIGL SERPL-MCNC: 179 MG/DL
TSH SERPL DL<=0.05 MIU/L-ACNC: 1.79 UIU/ML
VITAMIN B-12: 313
VITAMIN D 25-HYDROXY: 16.2
VITAMIN D2, 25 HYDROXY: NORMAL
VITAMIN D3,25 HYDROXY: NORMAL
VLDLC SERPL CALC-MCNC: 36 MG/DL
WBC # BLD: 8.65 10^3/ML

## 2025-06-16 ENCOUNTER — RESULTS FOLLOW-UP (OUTPATIENT)
Dept: FAMILY MEDICINE CLINIC | Age: 45
End: 2025-06-16

## 2025-06-16 DIAGNOSIS — Z13.6 SCREENING FOR CARDIOVASCULAR CONDITION: ICD-10-CM

## 2025-06-16 DIAGNOSIS — Z13.29 SCREENING FOR THYROID DISORDER: ICD-10-CM

## 2025-06-16 DIAGNOSIS — Z13.21 ENCOUNTER FOR VITAMIN DEFICIENCY SCREENING: ICD-10-CM

## 2025-06-16 DIAGNOSIS — Z13.0 SCREENING FOR DEFICIENCY ANEMIA: ICD-10-CM

## 2025-06-16 DIAGNOSIS — E11.9 TYPE 2 DIABETES MELLITUS WITHOUT COMPLICATION, WITHOUT LONG-TERM CURRENT USE OF INSULIN (HCC): ICD-10-CM

## 2025-06-16 DIAGNOSIS — E78.00 HYPERCHOLESTEREMIA: ICD-10-CM

## 2025-08-06 DIAGNOSIS — E11.9 TYPE 2 DIABETES MELLITUS WITHOUT COMPLICATION, WITHOUT LONG-TERM CURRENT USE OF INSULIN (HCC): ICD-10-CM
